# Patient Record
Sex: MALE | Race: OTHER | HISPANIC OR LATINO | Employment: OTHER | URBAN - METROPOLITAN AREA
[De-identification: names, ages, dates, MRNs, and addresses within clinical notes are randomized per-mention and may not be internally consistent; named-entity substitution may affect disease eponyms.]

---

## 2018-07-30 ENCOUNTER — HOSPITAL ENCOUNTER (OUTPATIENT)
Facility: HOSPITAL | Age: 68
Setting detail: OBSERVATION
Discharge: HOME | End: 2018-07-31
Attending: EMERGENCY MEDICINE | Admitting: INTERNAL MEDICINE
Payer: MEDICARE

## 2018-07-30 ENCOUNTER — APPOINTMENT (EMERGENCY)
Dept: RADIOLOGY | Facility: HOSPITAL | Age: 68
End: 2018-07-30
Payer: MEDICARE

## 2018-07-30 DIAGNOSIS — I10 HYPERTENSION: ICD-10-CM

## 2018-07-30 DIAGNOSIS — R07.9 CHEST PAIN: Primary | ICD-10-CM

## 2018-07-30 DIAGNOSIS — E11.9 DIABETES (HCC): ICD-10-CM

## 2018-07-30 DIAGNOSIS — I25.10 CAD (CORONARY ARTERY DISEASE): ICD-10-CM

## 2018-07-30 PROBLEM — E78.5 HYPERLIPIDEMIA: Status: ACTIVE | Noted: 2018-07-30

## 2018-07-30 PROBLEM — N17.9 AKI (ACUTE KIDNEY INJURY) (HCC): Status: ACTIVE | Noted: 2018-07-30

## 2018-07-30 LAB
ALBUMIN SERPL BCP-MCNC: 3.5 G/DL (ref 3.5–5)
ALP SERPL-CCNC: 76 U/L (ref 46–116)
ALT SERPL W P-5'-P-CCNC: 23 U/L (ref 12–78)
ANION GAP SERPL CALCULATED.3IONS-SCNC: 8 MMOL/L (ref 4–13)
APTT PPP: 28 SECONDS (ref 24–36)
APTT PPP: 73 SECONDS (ref 24–36)
AST SERPL W P-5'-P-CCNC: 18 U/L (ref 5–45)
ATRIAL RATE: 56 BPM
ATRIAL RATE: 63 BPM
ATRIAL RATE: 72 BPM
BASOPHILS # BLD AUTO: 0.06 THOUSANDS/ΜL (ref 0–0.1)
BASOPHILS NFR BLD AUTO: 1 % (ref 0–1)
BILIRUB SERPL-MCNC: 0.2 MG/DL (ref 0.2–1)
BUN SERPL-MCNC: 22 MG/DL (ref 5–25)
CALCIUM SERPL-MCNC: 8.6 MG/DL (ref 8.3–10.1)
CHLORIDE SERPL-SCNC: 105 MMOL/L (ref 100–108)
CO2 SERPL-SCNC: 26 MMOL/L (ref 21–32)
CREAT SERPL-MCNC: 1.85 MG/DL (ref 0.6–1.3)
EOSINOPHIL # BLD AUTO: 0.22 THOUSAND/ΜL (ref 0–0.61)
EOSINOPHIL NFR BLD AUTO: 3 % (ref 0–6)
ERYTHROCYTE [DISTWIDTH] IN BLOOD BY AUTOMATED COUNT: 13.2 % (ref 11.6–15.1)
GFR SERPL CREATININE-BSD FRML MDRD: 37 ML/MIN/1.73SQ M
GLUCOSE SERPL-MCNC: 187 MG/DL (ref 65–140)
GLUCOSE SERPL-MCNC: 241 MG/DL (ref 65–140)
HCT VFR BLD AUTO: 32.7 % (ref 36.5–49.3)
HGB BLD-MCNC: 10.6 G/DL (ref 12–17)
IMM GRANULOCYTES # BLD AUTO: 0.02 THOUSAND/UL (ref 0–0.2)
IMM GRANULOCYTES NFR BLD AUTO: 0 % (ref 0–2)
INR PPP: 0.98 (ref 0.86–1.17)
LYMPHOCYTES # BLD AUTO: 2.47 THOUSANDS/ΜL (ref 0.6–4.47)
LYMPHOCYTES NFR BLD AUTO: 29 % (ref 14–44)
MCH RBC QN AUTO: 27.7 PG (ref 26.8–34.3)
MCHC RBC AUTO-ENTMCNC: 32.4 G/DL (ref 31.4–37.4)
MCV RBC AUTO: 85 FL (ref 82–98)
MONOCYTES # BLD AUTO: 0.63 THOUSAND/ΜL (ref 0.17–1.22)
MONOCYTES NFR BLD AUTO: 7 % (ref 4–12)
NEUTROPHILS # BLD AUTO: 5.12 THOUSANDS/ΜL (ref 1.85–7.62)
NEUTS SEG NFR BLD AUTO: 60 % (ref 43–75)
NRBC BLD AUTO-RTO: 0 /100 WBCS
P AXIS: 44 DEGREES
P AXIS: 45 DEGREES
P AXIS: 56 DEGREES
PLATELET # BLD AUTO: 250 THOUSANDS/UL (ref 149–390)
PLATELET # BLD AUTO: 273 THOUSANDS/UL (ref 149–390)
PMV BLD AUTO: 10.2 FL (ref 8.9–12.7)
PMV BLD AUTO: 10.3 FL (ref 8.9–12.7)
POTASSIUM SERPL-SCNC: 4.9 MMOL/L (ref 3.5–5.3)
PR INTERVAL: 144 MS
PR INTERVAL: 150 MS
PR INTERVAL: 152 MS
PROT SERPL-MCNC: 7.3 G/DL (ref 6.4–8.2)
PROTHROMBIN TIME: 12.9 SECONDS (ref 11.8–14.2)
QRS AXIS: 28 DEGREES
QRS AXIS: 35 DEGREES
QRS AXIS: 65 DEGREES
QRSD INTERVAL: 102 MS
QRSD INTERVAL: 92 MS
QRSD INTERVAL: 92 MS
QT INTERVAL: 364 MS
QT INTERVAL: 382 MS
QT INTERVAL: 382 MS
QTC INTERVAL: 368 MS
QTC INTERVAL: 390 MS
QTC INTERVAL: 398 MS
RBC # BLD AUTO: 3.83 MILLION/UL (ref 3.88–5.62)
SODIUM SERPL-SCNC: 139 MMOL/L (ref 136–145)
T WAVE AXIS: -16 DEGREES
T WAVE AXIS: -3 DEGREES
T WAVE AXIS: -6 DEGREES
TROPONIN I SERPL-MCNC: 0.07 NG/ML
TROPONIN I SERPL-MCNC: 0.07 NG/ML
TROPONIN I SERPL-MCNC: 0.08 NG/ML
VENTRICULAR RATE: 56 BPM
VENTRICULAR RATE: 63 BPM
VENTRICULAR RATE: 72 BPM
WBC # BLD AUTO: 8.52 THOUSAND/UL (ref 4.31–10.16)

## 2018-07-30 PROCEDURE — 80053 COMPREHEN METABOLIC PANEL: CPT | Performed by: EMERGENCY MEDICINE

## 2018-07-30 PROCEDURE — 99220 PR INITIAL OBSERVATION CARE/DAY 70 MINUTES: CPT | Performed by: INTERNAL MEDICINE

## 2018-07-30 PROCEDURE — 71046 X-RAY EXAM CHEST 2 VIEWS: CPT

## 2018-07-30 PROCEDURE — 85730 THROMBOPLASTIN TIME PARTIAL: CPT | Performed by: INTERNAL MEDICINE

## 2018-07-30 PROCEDURE — 96374 THER/PROPH/DIAG INJ IV PUSH: CPT

## 2018-07-30 PROCEDURE — 85025 COMPLETE CBC W/AUTO DIFF WBC: CPT | Performed by: EMERGENCY MEDICINE

## 2018-07-30 PROCEDURE — 82948 REAGENT STRIP/BLOOD GLUCOSE: CPT

## 2018-07-30 PROCEDURE — 93010 ELECTROCARDIOGRAM REPORT: CPT | Performed by: INTERNAL MEDICINE

## 2018-07-30 PROCEDURE — 93005 ELECTROCARDIOGRAM TRACING: CPT

## 2018-07-30 PROCEDURE — 36415 COLL VENOUS BLD VENIPUNCTURE: CPT

## 2018-07-30 PROCEDURE — 85730 THROMBOPLASTIN TIME PARTIAL: CPT | Performed by: PHYSICIAN ASSISTANT

## 2018-07-30 PROCEDURE — 84484 ASSAY OF TROPONIN QUANT: CPT | Performed by: EMERGENCY MEDICINE

## 2018-07-30 PROCEDURE — 99204 OFFICE O/P NEW MOD 45 MIN: CPT | Performed by: INTERNAL MEDICINE

## 2018-07-30 PROCEDURE — 84484 ASSAY OF TROPONIN QUANT: CPT | Performed by: INTERNAL MEDICINE

## 2018-07-30 PROCEDURE — 85610 PROTHROMBIN TIME: CPT | Performed by: EMERGENCY MEDICINE

## 2018-07-30 PROCEDURE — 85049 AUTOMATED PLATELET COUNT: CPT | Performed by: PHYSICIAN ASSISTANT

## 2018-07-30 PROCEDURE — 99285 EMERGENCY DEPT VISIT HI MDM: CPT

## 2018-07-30 RX ORDER — LATANOPROST 50 UG/ML
1 SOLUTION/ DROPS OPHTHALMIC
Status: DISCONTINUED | OUTPATIENT
Start: 2018-07-30 | End: 2018-07-31 | Stop reason: HOSPADM

## 2018-07-30 RX ORDER — HEPARIN SODIUM 1000 [USP'U]/ML
4000 INJECTION, SOLUTION INTRAVENOUS; SUBCUTANEOUS AS NEEDED
Status: DISCONTINUED | OUTPATIENT
Start: 2018-07-30 | End: 2018-07-31

## 2018-07-30 RX ORDER — TERAZOSIN 5 MG/1
10 CAPSULE ORAL
Status: DISCONTINUED | OUTPATIENT
Start: 2018-07-30 | End: 2018-07-31 | Stop reason: HOSPADM

## 2018-07-30 RX ORDER — HEPARIN SODIUM 1000 [USP'U]/ML
2000 INJECTION, SOLUTION INTRAVENOUS; SUBCUTANEOUS AS NEEDED
Status: DISCONTINUED | OUTPATIENT
Start: 2018-07-30 | End: 2018-07-31

## 2018-07-30 RX ORDER — AMLODIPINE BESYLATE 5 MG/1
5 TABLET ORAL DAILY
Status: DISCONTINUED | OUTPATIENT
Start: 2018-07-31 | End: 2018-07-31 | Stop reason: HOSPADM

## 2018-07-30 RX ORDER — ASPIRIN 81 MG/1
324 TABLET, CHEWABLE ORAL ONCE
Status: COMPLETED | OUTPATIENT
Start: 2018-07-30 | End: 2018-07-30

## 2018-07-30 RX ORDER — ATORVASTATIN CALCIUM 40 MG/1
40 TABLET, FILM COATED ORAL
Status: DISCONTINUED | OUTPATIENT
Start: 2018-07-31 | End: 2018-07-30 | Stop reason: SDUPTHER

## 2018-07-30 RX ORDER — HEPARIN SODIUM 5000 [USP'U]/ML
5000 INJECTION, SOLUTION INTRAVENOUS; SUBCUTANEOUS EVERY 8 HOURS SCHEDULED
Status: DISCONTINUED | OUTPATIENT
Start: 2018-07-30 | End: 2018-07-30

## 2018-07-30 RX ORDER — NITROGLYCERIN 0.4 MG/1
0.4 TABLET SUBLINGUAL ONCE
Status: COMPLETED | OUTPATIENT
Start: 2018-07-30 | End: 2018-07-30

## 2018-07-30 RX ORDER — DOCUSATE SODIUM 100 MG/1
100 CAPSULE, LIQUID FILLED ORAL 2 TIMES DAILY
Status: DISCONTINUED | OUTPATIENT
Start: 2018-07-30 | End: 2018-07-31 | Stop reason: HOSPADM

## 2018-07-30 RX ORDER — TERAZOSIN 10 MG/1
10 CAPSULE ORAL
COMMUNITY

## 2018-07-30 RX ORDER — NITROGLYCERIN 0.4 MG/1
0.4 TABLET SUBLINGUAL
Status: DISCONTINUED | OUTPATIENT
Start: 2018-07-30 | End: 2018-07-31 | Stop reason: HOSPADM

## 2018-07-30 RX ORDER — ACETAMINOPHEN 325 MG/1
650 TABLET ORAL EVERY 6 HOURS PRN
Status: DISCONTINUED | OUTPATIENT
Start: 2018-07-30 | End: 2018-07-31 | Stop reason: HOSPADM

## 2018-07-30 RX ORDER — GLIPIZIDE 5 MG/1
2.5 TABLET ORAL
COMMUNITY

## 2018-07-30 RX ORDER — CARVEDILOL 3.12 MG/1
3.12 TABLET ORAL 2 TIMES DAILY WITH MEALS
Status: DISCONTINUED | OUTPATIENT
Start: 2018-07-30 | End: 2018-07-31 | Stop reason: HOSPADM

## 2018-07-30 RX ORDER — ASPIRIN 81 MG/1
81 TABLET, CHEWABLE ORAL DAILY
Status: DISCONTINUED | OUTPATIENT
Start: 2018-07-31 | End: 2018-07-31 | Stop reason: HOSPADM

## 2018-07-30 RX ORDER — ATORVASTATIN CALCIUM 40 MG/1
40 TABLET, FILM COATED ORAL
Status: DISCONTINUED | OUTPATIENT
Start: 2018-07-30 | End: 2018-07-31 | Stop reason: HOSPADM

## 2018-07-30 RX ORDER — HEPARIN SODIUM 10000 [USP'U]/100ML
3-20 INJECTION, SOLUTION INTRAVENOUS
Status: DISCONTINUED | OUTPATIENT
Start: 2018-07-30 | End: 2018-07-31

## 2018-07-30 RX ORDER — HEPARIN SODIUM 1000 [USP'U]/ML
4000 INJECTION, SOLUTION INTRAVENOUS; SUBCUTANEOUS ONCE
Status: COMPLETED | OUTPATIENT
Start: 2018-07-30 | End: 2018-07-30

## 2018-07-30 RX ORDER — SODIUM CHLORIDE 9 MG/ML
50 INJECTION, SOLUTION INTRAVENOUS CONTINUOUS
Status: DISPENSED | OUTPATIENT
Start: 2018-07-30 | End: 2018-07-31

## 2018-07-30 RX ORDER — LATANOPROST 50 UG/ML
1 SOLUTION/ DROPS OPHTHALMIC
COMMUNITY

## 2018-07-30 RX ORDER — LABETALOL HYDROCHLORIDE 5 MG/ML
10 INJECTION, SOLUTION INTRAVENOUS ONCE
Status: COMPLETED | OUTPATIENT
Start: 2018-07-30 | End: 2018-07-30

## 2018-07-30 RX ORDER — ONDANSETRON 2 MG/ML
4 INJECTION INTRAMUSCULAR; INTRAVENOUS EVERY 6 HOURS PRN
Status: DISCONTINUED | OUTPATIENT
Start: 2018-07-30 | End: 2018-07-31 | Stop reason: HOSPADM

## 2018-07-30 RX ADMIN — HEPARIN SODIUM AND DEXTROSE 12 UNITS/KG/HR: 10000; 5 INJECTION INTRAVENOUS at 17:54

## 2018-07-30 RX ADMIN — TERAZOSIN HYDROCHLORIDE 10 MG: 5 CAPSULE ORAL at 22:10

## 2018-07-30 RX ADMIN — LABETALOL 20 MG/4 ML (5 MG/ML) INTRAVENOUS SYRINGE 10 MG: at 15:00

## 2018-07-30 RX ADMIN — ATORVASTATIN CALCIUM 40 MG: 40 TABLET, FILM COATED ORAL at 19:03

## 2018-07-30 RX ADMIN — ASPIRIN 81 MG 324 MG: 81 TABLET ORAL at 14:59

## 2018-07-30 RX ADMIN — CARVEDILOL 3.12 MG: 3.12 TABLET, FILM COATED ORAL at 19:02

## 2018-07-30 RX ADMIN — SODIUM CHLORIDE 50 ML/HR: 0.9 INJECTION, SOLUTION INTRAVENOUS at 19:07

## 2018-07-30 RX ADMIN — NITROGLYCERIN 0.4 MG: 0.4 TABLET SUBLINGUAL at 14:59

## 2018-07-30 RX ADMIN — DOCUSATE SODIUM 100 MG: 100 CAPSULE, LIQUID FILLED ORAL at 22:11

## 2018-07-30 RX ADMIN — NITROGLYCERIN 0.5 INCH: 20 OINTMENT TOPICAL at 16:30

## 2018-07-30 RX ADMIN — INSULIN DETEMIR 10 UNITS: 100 INJECTION, SOLUTION SUBCUTANEOUS at 22:11

## 2018-07-30 RX ADMIN — HEPARIN SODIUM 4000 UNITS: 1000 INJECTION, SOLUTION INTRAVENOUS; SUBCUTANEOUS at 17:48

## 2018-07-30 RX ADMIN — LATANOPROST 1 DROP: 50 SOLUTION OPHTHALMIC at 22:12

## 2018-07-30 RX ADMIN — INSULIN LISPRO 1 UNITS: 100 INJECTION, SOLUTION INTRAVENOUS; SUBCUTANEOUS at 22:13

## 2018-07-30 RX ADMIN — METOPROLOL TARTRATE 25 MG: 25 TABLET ORAL at 22:11

## 2018-07-30 NOTE — H&P
H&P- Kamaljit Maldonado 1950, 80 y o  male MRN: [id number]    Unit/Bed#: ED 10 Encounter: [id number]    Primary Care Provider: No primary care provider on file  Date and time admitted to hospital: 7/30/2018  2:00 PM        * Chest pain   Assessment & Plan    Patient presents with history of CP x 3 days  Trop mildly elevated at 0 07  Multiple cardiac risk factors: DM, HTN, hyperlipidemia, male, age 80  Monitor serial troponins, EKGs  Cardiology consult: Stress test vs  Cardiac cath  Check Echo for regional wall motion abnormalities  Asa, BB, Statin, nitro prn  Hyperlipidemia   Assessment & Plan    Continue Statin and check fasting cholesterol  Hypertension   Assessment & Plan    Given Labetolol in ED  Monitor  Will begin Metoprolol  Hold Lisinopril secondary to HEIDY  HEIDY (acute kidney injury) ([facility] [address] )   Assessment & Plan    Baseline Cr unknown  Will trend  Hold nephrotoxic meds  Diabetes ([facility] [address] )   Assessment & Plan    No results found for: HGBA1C    No results for input(s): POCGLU in the last 72 hours  Blood Sugar Average: Last 72 hrs:    He has DM Type 2  On Levemir, Glipizide, and Metformin at home  Monitor POCs, check HGB A1C  Hold oral agents  Continue Levemir and add sliding scale coverage  Diabetic diet  VTE Prophylaxis: Heparin  / sequential compression device   Code Status: Full code  POLST: POLST is not applicable to this patient  Discussion with family: Family members present at bedside  Anticipated Length of Stay:  Patient will be admitted on an Observation basis with an anticipated length of stay of  < 2 midnights  Justification for Hospital Stay: ACS rule out  Total Time for Visit, including Counseling / Coordination of Care: 45 minutes  Greater than 50% of this total time spent on direct patient counseling and coordination of care      Chief Complaint:   Chest pain    History of Present Illness:    Kamaljit Maldonado is a 80 y o  male who presents with chest pain x 3 days  He reports of worsening chest pain today which was unremitting and thus he presented for further evaluation  He describes it as a pressure and tightness  He notes of radiation to the neck and shoulder  He has DM, HTN, and high cholesterol  He reports a vague history of a possible stress test in the 1990s which was negative  He reports a father with a history of an MI at [de-identified]  No SOB  No N/V  No dizziness  He reports improvement of his CP with nitro  Review of Systems:    Review of Systems   Constitutional: Negative  Negative for appetite change, fatigue and fever  HENT: Negative  Eyes: Negative  Respiratory: Negative  Negative for cough, shortness of breath and wheezing  Cardiovascular: Positive for chest pain and palpitations  Gastrointestinal: Negative  Negative for abdominal distention, abdominal pain, nausea and vomiting  Endocrine: Negative  Genitourinary: Negative  Musculoskeletal: Positive for neck pain  Skin: Negative  Neurological: Negative  Psychiatric/Behavioral: Negative  All other systems reviewed and are negative  Past Medical and Surgical History:     Past Medical History:   Diagnosis Date    BPH (benign prostatic hyperplasia)     Diabetes mellitus ([facility] [address] )     Dyslipidemia     Hypertension        Past Surgical History:   Procedure Laterality Date    HERNIA REPAIR         Meds/Allergies:    Prior to Admission medications    Medication Sig Start Date End Date Taking?  Authorizing Provider   Cholecalciferol 1000 units TBDP Take by mouth daily   Yes Historical Provider, MD   glipiZIDE (GLUCOTROL) 5 mg tablet Take 2 5 mg by mouth daily before dinner   Yes Historical Provider, MD   insulin detemir (LEVEMIR) 100 units/mL subcutaneous injection Inject 10 Units under the skin daily at bedtime   Yes Historical Provider, MD   latanoprost (XALATAN) 0 005 % ophthalmic solution Administer 1 drop to both eyes daily at bedtime Yes Historical Provider, MD   LISINOPRIL PO Take 20 mg by mouth daily at bedtime   Yes Historical Provider, MD   metFORMIN (GLUCOPHAGE) 500 mg tablet Take 1,000 mg by mouth 2 (two) times a day with meals   Yes Historical Provider, MD   saxagliptin (ONGLYZA) 5 MG tablet Take 5 mg by mouth daily   Yes Historical Provider, MD   SIMVASTATIN PO Take by mouth daily   Yes Historical Provider, MD   terazosin (HYTRIN) 10 MG capsule Take 10 mg by mouth daily at bedtime   Yes Historical Provider, MD     I have reviewed home medications with patient personally  Allergies: No Known Allergies    Social History:     Marital Status: /Civil Union   Occupation: Retired from [employer]  Patient Pre-hospital Living Situation: Resides at home with wife  Patient Pre-hospital Level of Mobility: Ambulatory  Patient Pre-hospital Diet Restrictions: Diabetic diet  Substance Use History:   History   Alcohol Use No     History   Smoking Status    Never Smoker   Smokeless Tobacco    Never Used     History   Drug Use No       Family History:    Family History   Problem Relation Age of Onset    Heart attack Father [de-identified]       Physical Exam:     Vitals:   Blood Pressure: 136/72 (07/30/18 1515)  Pulse: 59 (07/30/18 1515)  Temperature: 98 1 °F (36 7 °C) (07/30/18 1410)  Temp Source: Oral (07/30/18 1410)  Respirations: 18 (07/30/18 1515)  Height: 5' 9" (175 3 cm) (07/30/18 1408)  Weight - Scale: 84 4 kg (186 lb) (07/30/18 1408)  SpO2: 97 % (07/30/18 1515)    Physical Exam   Constitutional: He is oriented to person, place, and time  He appears well-developed and well-nourished  No distress  HENT:   Head: Normocephalic and atraumatic  Eyes: No scleral icterus  Neck: Neck supple  Cardiovascular: Normal rate, regular rhythm and normal heart sounds  Exam reveals no gallop  No murmur heard  Pulmonary/Chest: Effort normal and breath sounds normal  No respiratory distress  He exhibits no tenderness  Abdominal: Soft   Bowel sounds are normal  There is no tenderness  Musculoskeletal: Normal range of motion  He exhibits no edema  Neurological: He is alert and oriented to person, place, and time  Skin: Skin is warm and dry  He is not diaphoretic  Psychiatric: He has a normal mood and affect  Nursing note and vitals reviewed  Additional Data:     Lab Results: I have personally reviewed pertinent reports  Results from last 7 days  Lab Units 07/30/18  1411   WBC Thousand/uL 8 52   HEMOGLOBIN g/dL 10 6*   HEMATOCRIT % 32 7*   PLATELETS Thousands/uL 273   NEUTROS PCT % 60   LYMPHS PCT % 29   MONOS PCT % 7   EOS PCT % 3       Results from last 7 days  Lab Units 07/30/18  1411   SODIUM mmol/L 139   POTASSIUM mmol/L 4 9   CHLORIDE mmol/L 105   CO2 mmol/L 26   BUN mg/dL 22   CREATININE mg/dL 1 85*   CALCIUM mg/dL 8 6   TOTAL PROTEIN g/dL 7 3   BILIRUBIN TOTAL mg/dL 0 20   ALK PHOS U/L 76   ALT U/L 23   AST U/L 18   GLUCOSE RANDOM mg/dL 241*       Results from last 7 days  Lab Units 07/30/18  1411   INR  0 98               Imaging: I have personally reviewed pertinent reports  and I have personally reviewed pertinent films in PACS    X-ray chest 2 views   Final Result by Chela Shea MD (07/30 1548)      No acute cardiopulmonary disease  Workstation performed: [id number]             EKG, Pathology, and Other Studies Reviewed on Admission:   · EKG: personally reviewed: NSR and inferior t wave inversions, no ST elevations  Allscripts / Epic Records Reviewed: Yes     ** Please Note: This note has been constructed using a voice recognition system   **

## 2018-07-30 NOTE — ED PROVIDER NOTES
History  Chief Complaint   Patient presents with    Chest Pain     Patient c/o left upper chest pain that started 3 days ago  Patient also states"he started having arm pain along with the chest pain"  C/o L sided chest pressure for 3 days, intermittent  Today cp is worse and more constant  Not necessarily associated with exertion  No sob, no diaphoresis, dizziness, nausea  Cp radiates to his L neck and L arm  Pt  Mariia Sol his HTN meds this am   His bp is 183/89  No cardiac history  CAD risk factors:  IDDM, HTN, hypercholesterolemia, nonsmoker, no premature family hx of CAD    Pt  Never had a stress test             None       Past Medical History:   Diagnosis Date    Diabetes mellitus ([facility] [address] )     Hypertension        Past Surgical History:   Procedure Laterality Date    HERNIA REPAIR         History reviewed  No pertinent family history  I have reviewed and agree with the history as documented  Social History   Substance Use Topics    Smoking status: Never Smoker    Smokeless tobacco: Never Used    Alcohol use No        Review of Systems   Constitutional: Negative for appetite change, fatigue and fever  HENT: Negative for sore throat  Respiratory: Negative for cough, shortness of breath and wheezing  Cardiovascular: Positive for chest pain  Negative for leg swelling  Gastrointestinal: Negative for abdominal pain, diarrhea and vomiting  Genitourinary: Negative for dysuria and flank pain  Musculoskeletal: Negative for back pain and neck pain  Skin: Negative for rash  Neurological: Negative for syncope and headaches  Psychiatric/Behavioral:        Mood normal       Physical Exam  Physical Exam   Constitutional: He is oriented to person, place, and time  He appears well-developed and well-nourished  HENT:   Head: Normocephalic and atraumatic  Neck: Normal range of motion  Neck supple  Cardiovascular: Normal rate and regular rhythm      Pulmonary/Chest: Effort normal and breath sounds normal    Abdominal: Soft  There is no tenderness  Musculoskeletal: Normal range of motion  Neurological: He is alert and oriented to person, place, and time  Skin: Skin is warm and dry  Nursing note and vitals reviewed        Vital Signs  ED Triage Vitals   Temperature Pulse Respirations Blood Pressure SpO2   07/30/18 1410 07/30/18 1408 07/30/18 1408 07/30/18 1408 07/30/18 1408   98 1 °F (36 7 °C) 71 18 (!) 183/89 100 %      Temp Source Heart Rate Source Patient Position - Orthostatic VS BP Location FiO2 (%)   07/30/18 1408 07/30/18 1408 07/30/18 1408 07/30/18 1408 --   Oral Monitor Lying Right arm       Pain Score       --                  Vitals:    07/30/18 1408 07/30/18 1515   BP: (!) 183/89 136/72   Pulse: 71 59   Patient Position - Orthostatic VS: Lying Sitting       Visual Acuity      ED Medications  Medications   nitroglycerin (NITRO-BID) 2 % TD ointment 0 5 inch (not administered)   aspirin chewable tablet 324 mg (324 mg Oral Given 7/30/18 1459)   nitroglycerin (NITROSTAT) SL tablet 0 4 mg (0 4 mg Sublingual Given 7/30/18 1459)   labetalol (NORMODYNE) injection 10 mg (10 mg Intravenous Given 7/30/18 1500)       Diagnostic Studies  Results Reviewed     Procedure Component Value Units Date/Time    Troponin I [[id number]]     Lab Status:  No result Specimen:  Blood     Troponin I [[id number]]  (Abnormal) Collected:  07/30/18 1411    Lab Status:  Final result Specimen:  Blood from Arm, Right Updated:  07/30/18 1440     Troponin I 0 07 (H) ng/mL     Comprehensive metabolic panel [[id number]]  (Abnormal) Collected:  07/30/18 1411    Lab Status:  Final result Specimen:  Blood from Arm, Right Updated:  07/30/18 1436     Sodium 139 mmol/L      Potassium 4 9 mmol/L      Chloride 105 mmol/L      CO2 26 mmol/L      Anion Gap 8 mmol/L      BUN 22 mg/dL      Creatinine 1 85 (H) mg/dL      Glucose 241 (H) mg/dL      Calcium 8 6 mg/dL      AST 18 U/L      ALT 23 U/L      Alkaline Phosphatase 76 U/L Total Protein 7 3 g/dL      Albumin 3 5 g/dL      Total Bilirubin 0 20 mg/dL      eGFR 37 ml/min/1 73sq m     Narrative:         National Kidney Disease Education Program recommendations are as follows:  GFR calculation is accurate only with a steady state creatinine  Chronic Kidney disease less than 60 ml/min/1 73 sq  meters  Kidney failure less than 15 ml/min/1 73 sq  meters  Protime-INR [[id number]]  (Normal) Collected:  07/30/18 1411    Lab Status:  Final result Specimen:  Blood from Arm, Right Updated:  07/30/18 1427     Protime 12 9 seconds      INR 0 98    CBC and differential [[id number]]  (Abnormal) Collected:  07/30/18 1411    Lab Status:  Final result Specimen:  Blood from Arm, Right Updated:  07/30/18 1417     WBC 8 52 Thousand/uL      RBC 3 83 (L) Million/uL      Hemoglobin 10 6 (L) g/dL      Hematocrit 32 7 (L) %      MCV 85 fL      MCH 27 7 pg      MCHC 32 4 g/dL      RDW 13 2 %      MPV 10 3 fL      Platelets 946 Thousands/uL      nRBC 0 /100 WBCs      Neutrophils Relative 60 %      Immat GRANS % 0 %      Lymphocytes Relative 29 %      Monocytes Relative 7 %      Eosinophils Relative 3 %      Basophils Relative 1 %      Neutrophils Absolute 5 12 Thousands/µL      Immature Grans Absolute 0 02 Thousand/uL      Lymphocytes Absolute 2 47 Thousands/µL      Monocytes Absolute 0 63 Thousand/µL      Eosinophils Absolute 0 22 Thousand/µL      Basophils Absolute 0 06 Thousands/µL                  X-ray chest 2 views   Final Result by Matheus Marino MD (07/30 1548)      No acute cardiopulmonary disease              Workstation performed: [id number]                    Procedures  ECG 12 Lead Documentation  Date/Time: 7/30/2018 2:10 PM  Performed by: NIKHIL Craven  Authorized by: NIKHIL Craven     Rate:     ECG rate:  77    ECG rate assessment: normal    Rhythm:     Rhythm: sinus rhythm    Comments:      T wave inversion in III           Phone Contacts  ED Phone Contact    ED Course HEART Risk Score      Most Recent Value   History  2 Filed at: 07/30/2018 1554   ECG  1 Filed at: 07/30/2018 1554   Age  2 Filed at: 07/30/2018 1554   Risk Factors  2 Filed at: 07/30/2018 1554   Troponin  1 Filed at: 07/30/2018 1554   Heart Score Risk Calculator   History  2 Filed at: 07/30/2018 1554   ECG  1 Filed at: 07/30/2018 1554   Age  2 Filed at: 07/30/2018 1554   Risk Factors  2 Filed at: 07/30/2018 1554   Troponin  1 Filed at: 07/30/2018 1554   HEART Score  8 Filed at: 07/30/2018 1554   HEART Score  8 Filed at: 07/30/2018 1554        Identification of Seniors at Risk      Most Recent Value   (ISAR) Identification of Seniors at Risk   Before the illness or injury that brought you to the Emergency, did you need someone to help you on a regular basis? 0 Filed at: 07/30/2018 1405   In the last 24 hours, have you needed more help than usual?  0 Filed at: 07/30/2018 1405   Have you been hospitalized for one or more nights during the past 6 months?    In general, do you see well? 1 Filed at: 07/30/2018 1405   In general, do you have serious problems with your memory?   0 Filed at: 07/30/2018 1405   Do you take more than three different medications every day?  0 Filed at: 07/30/2018 1405   ISAR Score  1 Filed at: 07/30/2018 1405                          MDM  Number of Diagnoses or Management Options  Chest pain:      Amount and/or Complexity of Data Reviewed  Clinical lab tests: ordered and reviewed  Tests in the radiology section of CPT®: ordered and reviewed    Risk of Complications, Morbidity, and/or Mortality  Presenting problems: moderate  General comments: Cp improved with asa , nitro, labetalol    hospitalist admitted for further workup      CritCare Time    Disposition  Final diagnoses:   Chest pain   Hypertension     Time reflects when diagnosis was documented in both MDM as applicable and the Disposition within this note     Time User Action Codes Description Comment    7/30/2018  3:52 PM Terri Martel Add [R07 9] Chest pain     7/30/2018  3:56 PM Sixto Delvalle Add [I10] Hypertension       ED Disposition     ED Disposition Condition Comment    Admit  Case was discussed with DONAL  and the patient's admission status was agreed to be obs  Jaspreet Deras      Follow-up Information    None         Patient's Medications    No medications on file     No discharge procedures on file      ED Provider  Electronically Signed by           Terra Vaughan MD  07/30/18 9334

## 2018-07-30 NOTE — CONSULTS
Consultation - Cardiology    Candace Patino 80 y o  male MRN: [id number]  Unit/Bed#: ED 10 Encounter: [id number]    Physician Requesting Consult: Rubin Dumont*    Reason for Consult / Principal Problem: chest pain     HPI: Candace Patino is a 80y o  year old male with a past medical history significant for hypertension, hyperlipidemia, diabetes mellitus  He presents with complaints of left sided chest pressure with radiation to neck and left arm which has been intermittent for the last 3 days  His symptoms were worse and more constant this morning  He does not necessarily relate exertional component  He got nitro past and now feels improved  He denies prior history of CAD/MI  He did have cardiac catheterization in 1997 which was without CAD  He denies history of tobacco abuse  He denies family history of CAD  He denies history of major bleeding or allergies  He is unaware of CKD history  Historical Information   Past Medical History:   Diagnosis Date    BPH (benign prostatic hyperplasia)     Diabetes mellitus ([address] )     Dyslipidemia     Hypertension      Past Surgical History:   Procedure Laterality Date    HERNIA REPAIR       History   Alcohol Use No     History   Drug Use No     History   Smoking Status    Never Smoker   Smokeless Tobacco    Never Used       FAMILY HISTORY:  Family History   Problem Relation Age of Onset    Heart attack Father [de-identified]       MEDS & ALLERGIES:  all current active meds have been reviewed and current meds:   No current facility-administered medications for this encounter  No current facility-administered medications for this encounter  No Known Allergies      REVIEW OF SYSTEMS:  Constitutional: Denies fever or chills  Eyes: Denies visual disturbance change in visual acuity   HENT: Denies nasal congestion or sore throat   Respiratory: +SOB Denies cough  Cardiovascular: +CP   Deniespalpitations or lower extremity swelling   GI: Denies abdominal pain, nausea, vomiting, bloody stools or diarrhea   : Denies dysuria, frequency, hematuria  Musculoskeletal: Denies back pain or joint pain   Neurologic: Denies lightheadedness, syncope, headache, focal weakness or sensory changes   Psychiatric: Denies depression, anxiety or panic       PHYSICAL EXAM:  Vitals:   Vitals:    07/30/18 1515   BP: 136/72   Pulse: 59   Resp: 18   Temp:    SpO2: 97%     Body mass index is 27 47 kg/m²  Systolic (98OGK), HCK:008 , Min:136 , BIN:254     Diastolic (38ZJX), QNM:90, Min:72, Max:89    No intake or output data in the 24 hours ending 07/30/18 1713  Weight (last 2 days)     Date/Time   Weight    07/30/18 1408  84 4 (186)            Invasive Devices     Peripheral Intravenous Line            Peripheral IV 07/30/18 Right Antecubital less than 1 day                General: Patient is not in acute distress  Laying comfortably in the bed  Head: Normocephalic  Atraumatic  HEENT: Both pupils normal sized, round and reactive to light  Nonicteric  Neck: JVP not elevated  Trachea central    Respiratory: Bilateral bronchovascular breath sounds with no crackles or rhonchi  Cardiovascular: RRR  S1 and S2 normal  No murmur, rub or gallop  GI: Abdomen soft, nontender  No guarding or rigidity  Neurologic: Patient is awake, alert, responding to commands  Well-oriented to time, place and person   Moving all extremities  Extremities: No clubbing, cyanosis or edema  Integument: No skin rashes or ulceration      LABORATORY RESULTS:    Results from last 7 days  Lab Units 07/30/18  1411   TROPONIN I ng/mL 0 07*       CBC with diff:   Results from last 7 days  Lab Units 07/30/18  1411   WBC Thousand/uL 8 52   HEMOGLOBIN g/dL 10 6*   HEMATOCRIT % 32 7*   MCV fL 85   PLATELETS Thousands/uL 273   MCH pg 27 7   MCHC g/dL 32 4   RDW % 13 2   MPV fL 10 3   NRBC AUTO /100 WBCs 0       CMP:  Results from last 7 days  Lab Units 07/30/18  1411   SODIUM mmol/L 139   POTASSIUM mmol/L 4 9   CHLORIDE mmol/L 105   CO2 mmol/L 26   ANION GAP mmol/L 8   BUN mg/dL 22   CREATININE mg/dL 1 85*   GLUCOSE RANDOM mg/dL 241*   CALCIUM mg/dL 8 6   AST U/L 18   ALT U/L 23   ALK PHOS U/L 76   TOTAL PROTEIN g/dL 7 3   BILIRUBIN TOTAL mg/dL 0 20   EGFR ml/min/1 73sq m 37       BMP:  Results from last 7 days  Lab Units 07/30/18  1411   SODIUM mmol/L 139   POTASSIUM mmol/L 4 9   CHLORIDE mmol/L 105   CO2 mmol/L 26   BUN mg/dL 22   CREATININE mg/dL 1 85*   GLUCOSE RANDOM mg/dL 241*   CALCIUM mg/dL 8 6                          Results from last 7 days  Lab Units 07/30/18  1411   INR  0 98         Imaging: I have personally reviewed pertinent reports  Assessment and Plan:  Chest pain  -elevated troponin-- 0 07 will continue to trend  -EKG showed ischemic changes in inferior leads-- T wave inversions   -given patient's progressive symptoms of chest pressure and multiple risk factors he would likely be too high risk for stress testing alone  -Options for further evaluation discussed with the patient  Benefits and limitations of stress testing explained  Patient advised he would likely be too high-risk for CAD rule out with stress testing alone  Definitive evaluation with cardiac catheterization advised  Risks, benefits, and possible outcomes of cardiac catheterization discussed  He was warned of the risks of infection, bleeding, kidney damage, stroke, MI  Patient agreeable to cardiac catheterization and wishes to proceed  Consent to be obtained by interventional cardiologist Dr Leigh Ann Gonzalez who will be performing the procedure    -creatinine 1 85 will hydrate overnight   -will start on heparin drip  -echocardiogram to evaluate LVEF and wall motion tomorrow    Hypertension  -elevated, will start on Coreg and amlodipine  Hold lisinopril pending cardiac catheterization tomorrow elevated creatinine      Hyperlipidemia  -morning lipid panel ordered   Will start on lipitor           Code Status: No Order      Thank you for allowing us to participate in this patient's care  Counseling / Coordination of Care  Total floor / unit time spent today 35 minutes  Greater than 50% of total time was spent with the patient and / or family counseling and / or coordination of care  A description of the counseling / coordination of care: Review of history, current assessment, development of a plan         Madeline Blum PA-C  7/30/2018,5:13 PM

## 2018-07-30 NOTE — ASSESSMENT & PLAN NOTE
No results found for: HGBA1C    No results for input(s): POCGLU in the last 72 hours  Blood Sugar Average: Last 72 hrs:    He has DM Type 2  On Levemir, Glipizide, and Metformin at home  Monitor POCs, check HGB A1C  Hold oral agents  Continue Levemir and add sliding scale coverage  Diabetic diet

## 2018-07-31 ENCOUNTER — APPOINTMENT (OUTPATIENT)
Dept: NON INVASIVE DIAGNOSTICS | Facility: HOSPITAL | Age: 68
End: 2018-07-31
Payer: MEDICARE

## 2018-07-31 ENCOUNTER — APPOINTMENT (OUTPATIENT)
Dept: INTERVENTIONAL RADIOLOGY/VASCULAR | Facility: HOSPITAL | Age: 68
End: 2018-07-31
Payer: MEDICARE

## 2018-07-31 VITALS
WEIGHT: 188.93 LBS | OXYGEN SATURATION: 98 % | RESPIRATION RATE: 17 BRPM | SYSTOLIC BLOOD PRESSURE: 136 MMHG | BODY MASS INDEX: 27.98 KG/M2 | DIASTOLIC BLOOD PRESSURE: 78 MMHG | HEIGHT: 69 IN | HEART RATE: 60 BPM | TEMPERATURE: 98.4 F

## 2018-07-31 LAB
ANION GAP SERPL CALCULATED.3IONS-SCNC: 6 MMOL/L (ref 4–13)
APTT PPP: 58 SECONDS (ref 24–36)
APTT PPP: 62 SECONDS (ref 24–36)
BUN SERPL-MCNC: 20 MG/DL (ref 5–25)
CALCIUM SERPL-MCNC: 8.5 MG/DL (ref 8.3–10.1)
CHLORIDE SERPL-SCNC: 108 MMOL/L (ref 100–108)
CHOLEST SERPL-MCNC: 146 MG/DL (ref 50–200)
CO2 SERPL-SCNC: 27 MMOL/L (ref 21–32)
CREAT SERPL-MCNC: 1.53 MG/DL (ref 0.6–1.3)
ERYTHROCYTE [DISTWIDTH] IN BLOOD BY AUTOMATED COUNT: 13.4 % (ref 11.6–15.1)
EST. AVERAGE GLUCOSE BLD GHB EST-MCNC: 200 MG/DL
GFR SERPL CREATININE-BSD FRML MDRD: 46 ML/MIN/1.73SQ M
GLUCOSE P FAST SERPL-MCNC: 115 MG/DL (ref 65–99)
GLUCOSE SERPL-MCNC: 115 MG/DL (ref 65–140)
GLUCOSE SERPL-MCNC: 119 MG/DL (ref 65–140)
GLUCOSE SERPL-MCNC: 159 MG/DL (ref 65–140)
GLUCOSE SERPL-MCNC: 239 MG/DL (ref 65–140)
HBA1C MFR BLD: 8.6 % (ref 4.2–6.3)
HCT VFR BLD AUTO: 30.3 % (ref 36.5–49.3)
HDLC SERPL-MCNC: 51 MG/DL (ref 40–60)
HGB BLD-MCNC: 9.9 G/DL (ref 12–17)
INR PPP: 1.05 (ref 0.86–1.17)
LDLC SERPL CALC-MCNC: 87 MG/DL (ref 0–100)
MAGNESIUM SERPL-MCNC: 2 MG/DL (ref 1.6–2.6)
MCH RBC QN AUTO: 28 PG (ref 26.8–34.3)
MCHC RBC AUTO-ENTMCNC: 32.7 G/DL (ref 31.4–37.4)
MCV RBC AUTO: 86 FL (ref 82–98)
PLATELET # BLD AUTO: 257 THOUSANDS/UL (ref 149–390)
PMV BLD AUTO: 10.4 FL (ref 8.9–12.7)
POTASSIUM SERPL-SCNC: 5.2 MMOL/L (ref 3.5–5.3)
PROTHROMBIN TIME: 13.6 SECONDS (ref 11.8–14.2)
RBC # BLD AUTO: 3.53 MILLION/UL (ref 3.88–5.62)
SODIUM SERPL-SCNC: 141 MMOL/L (ref 136–145)
TRIGL SERPL-MCNC: 41 MG/DL
TROPONIN I SERPL-MCNC: 0.07 NG/ML
TROPONIN I SERPL-MCNC: 0.07 NG/ML
WBC # BLD AUTO: 8.13 THOUSAND/UL (ref 4.31–10.16)

## 2018-07-31 PROCEDURE — C1887 CATHETER, GUIDING: HCPCS | Performed by: PHYSICIAN ASSISTANT

## 2018-07-31 PROCEDURE — 82948 REAGENT STRIP/BLOOD GLUCOSE: CPT

## 2018-07-31 PROCEDURE — 84484 ASSAY OF TROPONIN QUANT: CPT | Performed by: NURSE PRACTITIONER

## 2018-07-31 PROCEDURE — C1894 INTRO/SHEATH, NON-LASER: HCPCS | Performed by: PHYSICIAN ASSISTANT

## 2018-07-31 PROCEDURE — 99153 MOD SED SAME PHYS/QHP EA: CPT | Performed by: PHYSICIAN ASSISTANT

## 2018-07-31 PROCEDURE — 93306 TTE W/DOPPLER COMPLETE: CPT | Performed by: INTERNAL MEDICINE

## 2018-07-31 PROCEDURE — 80061 LIPID PANEL: CPT | Performed by: PHYSICIAN ASSISTANT

## 2018-07-31 PROCEDURE — 85610 PROTHROMBIN TIME: CPT | Performed by: PHYSICIAN ASSISTANT

## 2018-07-31 PROCEDURE — C1769 GUIDE WIRE: HCPCS | Performed by: PHYSICIAN ASSISTANT

## 2018-07-31 PROCEDURE — 83036 HEMOGLOBIN GLYCOSYLATED A1C: CPT | Performed by: PHYSICIAN ASSISTANT

## 2018-07-31 PROCEDURE — 99152 MOD SED SAME PHYS/QHP 5/>YRS: CPT | Performed by: INTERNAL MEDICINE

## 2018-07-31 PROCEDURE — 92978 ENDOLUMINL IVUS OCT C 1ST: CPT | Performed by: PHYSICIAN ASSISTANT

## 2018-07-31 PROCEDURE — 99152 MOD SED SAME PHYS/QHP 5/>YRS: CPT | Performed by: PHYSICIAN ASSISTANT

## 2018-07-31 PROCEDURE — C1760 CLOSURE DEV, VASC: HCPCS | Performed by: PHYSICIAN ASSISTANT

## 2018-07-31 PROCEDURE — 93306 TTE W/DOPPLER COMPLETE: CPT

## 2018-07-31 PROCEDURE — 85730 THROMBOPLASTIN TIME PARTIAL: CPT | Performed by: PHYSICIAN ASSISTANT

## 2018-07-31 PROCEDURE — 80048 BASIC METABOLIC PNL TOTAL CA: CPT | Performed by: PHYSICIAN ASSISTANT

## 2018-07-31 PROCEDURE — 85730 THROMBOPLASTIN TIME PARTIAL: CPT | Performed by: INTERNAL MEDICINE

## 2018-07-31 PROCEDURE — 85027 COMPLETE CBC AUTOMATED: CPT | Performed by: PHYSICIAN ASSISTANT

## 2018-07-31 PROCEDURE — 93458 L HRT ARTERY/VENTRICLE ANGIO: CPT | Performed by: PHYSICIAN ASSISTANT

## 2018-07-31 PROCEDURE — C1753 CATH, INTRAVAS ULTRASOUND: HCPCS | Performed by: PHYSICIAN ASSISTANT

## 2018-07-31 PROCEDURE — 99217 PR OBSERVATION CARE DISCHARGE MANAGEMENT: CPT | Performed by: INTERNAL MEDICINE

## 2018-07-31 PROCEDURE — 93458 L HRT ARTERY/VENTRICLE ANGIO: CPT | Performed by: INTERNAL MEDICINE

## 2018-07-31 PROCEDURE — 83735 ASSAY OF MAGNESIUM: CPT | Performed by: PHYSICIAN ASSISTANT

## 2018-07-31 PROCEDURE — 92978 ENDOLUMINL IVUS OCT C 1ST: CPT | Performed by: INTERNAL MEDICINE

## 2018-07-31 RX ORDER — ASPIRIN 81 MG/1
81 TABLET, CHEWABLE ORAL DAILY
Qty: 30 TABLET | Refills: 0 | Status: SHIPPED | OUTPATIENT
Start: 2018-08-01

## 2018-07-31 RX ORDER — VERAPAMIL HCL 2.5 MG/ML
AMPUL (ML) INTRAVENOUS CODE/TRAUMA/SEDATION MEDICATION
Status: COMPLETED | OUTPATIENT
Start: 2018-07-31 | End: 2018-07-31

## 2018-07-31 RX ORDER — ISOSORBIDE MONONITRATE 30 MG/1
30 TABLET, EXTENDED RELEASE ORAL DAILY
Status: DISCONTINUED | OUTPATIENT
Start: 2018-07-31 | End: 2018-07-31 | Stop reason: HOSPADM

## 2018-07-31 RX ORDER — FENTANYL CITRATE 50 UG/ML
INJECTION, SOLUTION INTRAMUSCULAR; INTRAVENOUS CODE/TRAUMA/SEDATION MEDICATION
Status: COMPLETED | OUTPATIENT
Start: 2018-07-31 | End: 2018-07-31

## 2018-07-31 RX ORDER — ISOSORBIDE MONONITRATE 30 MG/1
30 TABLET, EXTENDED RELEASE ORAL DAILY
Qty: 30 TABLET | Refills: 0 | Status: SHIPPED | OUTPATIENT
Start: 2018-07-31

## 2018-07-31 RX ORDER — LIDOCAINE HYDROCHLORIDE 10 MG/ML
INJECTION, SOLUTION INFILTRATION; PERINEURAL CODE/TRAUMA/SEDATION MEDICATION
Status: COMPLETED | OUTPATIENT
Start: 2018-07-31 | End: 2018-07-31

## 2018-07-31 RX ORDER — MIDAZOLAM HYDROCHLORIDE 1 MG/ML
INJECTION INTRAMUSCULAR; INTRAVENOUS CODE/TRAUMA/SEDATION MEDICATION
Status: COMPLETED | OUTPATIENT
Start: 2018-07-31 | End: 2018-07-31

## 2018-07-31 RX ORDER — CARVEDILOL 3.12 MG/1
3.12 TABLET ORAL 2 TIMES DAILY WITH MEALS
Qty: 30 TABLET | Refills: 0 | Status: ON HOLD | OUTPATIENT
Start: 2018-07-31 | End: 2020-12-28 | Stop reason: SDUPTHER

## 2018-07-31 RX ORDER — ATORVASTATIN CALCIUM 40 MG/1
40 TABLET, FILM COATED ORAL
Qty: 30 TABLET | Refills: 0 | Status: SHIPPED | OUTPATIENT
Start: 2018-07-31

## 2018-07-31 RX ORDER — SODIUM CHLORIDE 9 MG/ML
75 INJECTION, SOLUTION INTRAVENOUS ONCE
Status: COMPLETED | OUTPATIENT
Start: 2018-07-31 | End: 2018-07-31

## 2018-07-31 RX ORDER — NITROGLYCERIN 0.4 MG/1
0.4 TABLET SUBLINGUAL
Qty: 90 TABLET | Refills: 0 | Status: SHIPPED | OUTPATIENT
Start: 2018-07-31

## 2018-07-31 RX ORDER — NITROGLYCERIN 20 MG/100ML
INJECTION INTRAVENOUS CODE/TRAUMA/SEDATION MEDICATION
Status: COMPLETED | OUTPATIENT
Start: 2018-07-31 | End: 2018-07-31

## 2018-07-31 RX ORDER — AMLODIPINE BESYLATE 5 MG/1
5 TABLET ORAL DAILY
Qty: 30 TABLET | Refills: 0 | Status: SHIPPED | OUTPATIENT
Start: 2018-08-01

## 2018-07-31 RX ADMIN — SODIUM CHLORIDE 75 ML/HR: 0.9 INJECTION, SOLUTION INTRAVENOUS at 11:25

## 2018-07-31 RX ADMIN — ASPIRIN 81 MG CHEWABLE TABLET 81 MG: 81 TABLET CHEWABLE at 10:45

## 2018-07-31 RX ADMIN — NITROGLYCERIN 200 MCG: 20 INJECTION INTRAVENOUS at 09:14

## 2018-07-31 RX ADMIN — LIDOCAINE HYDROCHLORIDE 1 ML: 10 INJECTION, SOLUTION INFILTRATION; PERINEURAL at 09:12

## 2018-07-31 RX ADMIN — LIDOCAINE HYDROCHLORIDE 9 ML: 10 INJECTION, SOLUTION INFILTRATION; PERINEURAL at 09:17

## 2018-07-31 RX ADMIN — FENTANYL CITRATE 50 MCG: 50 INJECTION, SOLUTION INTRAMUSCULAR; INTRAVENOUS at 09:59

## 2018-07-31 RX ADMIN — INSULIN LISPRO 1 UNITS: 100 INJECTION, SOLUTION INTRAVENOUS; SUBCUTANEOUS at 11:32

## 2018-07-31 RX ADMIN — CARVEDILOL 3.12 MG: 3.12 TABLET, FILM COATED ORAL at 18:03

## 2018-07-31 RX ADMIN — INSULIN LISPRO 2 UNITS: 100 INJECTION, SOLUTION INTRAVENOUS; SUBCUTANEOUS at 18:04

## 2018-07-31 RX ADMIN — ISOSORBIDE MONONITRATE 30 MG: 30 TABLET, EXTENDED RELEASE ORAL at 14:29

## 2018-07-31 RX ADMIN — ATORVASTATIN CALCIUM 40 MG: 40 TABLET, FILM COATED ORAL at 18:02

## 2018-07-31 RX ADMIN — AMLODIPINE BESYLATE 5 MG: 5 TABLET ORAL at 10:46

## 2018-07-31 RX ADMIN — CARVEDILOL 3.12 MG: 3.12 TABLET, FILM COATED ORAL at 07:30

## 2018-07-31 RX ADMIN — DOCUSATE SODIUM 100 MG: 100 CAPSULE, LIQUID FILLED ORAL at 18:02

## 2018-07-31 RX ADMIN — HEPARIN SODIUM 2000 UNITS: 1000 INJECTION, SOLUTION INTRAVENOUS; SUBCUTANEOUS at 07:29

## 2018-07-31 RX ADMIN — MIDAZOLAM HYDROCHLORIDE 2 MG: 1 INJECTION, SOLUTION INTRAMUSCULAR; INTRAVENOUS at 09:11

## 2018-07-31 RX ADMIN — VERAPAMIL HYDROCHLORIDE 2.5 MCG: 2.5 INJECTION, SOLUTION INTRAVENOUS at 09:14

## 2018-07-31 RX ADMIN — METOPROLOL TARTRATE 25 MG: 25 TABLET ORAL at 10:46

## 2018-07-31 RX ADMIN — FENTANYL CITRATE 50 MCG: 50 INJECTION, SOLUTION INTRAMUSCULAR; INTRAVENOUS at 09:11

## 2018-07-31 RX ADMIN — IODIXANOL 75 ML: 270 INJECTION, SOLUTION INTRAVASCULAR at 10:02

## 2018-07-31 RX ADMIN — DOCUSATE SODIUM 100 MG: 100 CAPSULE, LIQUID FILLED ORAL at 10:46

## 2018-07-31 NOTE — PLAN OF CARE
Problem: DISCHARGE PLANNING - CARE MANAGEMENT  Goal: Discharge to post-acute care or home with appropriate resources  INTERVENTIONS:  - Conduct assessment to determine patient/family and health care team treatment goals, and need for post-acute services based on payer coverage, community resources, and patient preferences, and barriers to discharge  - Address psychosocial, clinical, and financial barriers to discharge as identified in assessment in conjunction with the patient/family and health care team  - Arrange appropriate level of post-acute services according to patients   needs and preference and payer coverage in collaboration with the physician and health care team  - Communicate with and update the patient/family, physician, and health care team regarding progress on the discharge plan  - Arrange appropriate transportation to post-acute venues  Outcome: Progressing  Pt acknowledged understanding of observation status  Pt is independent with family support  He utilizes the services of the [facility]  He does not have any CM needs at this time  Will continue to assess needs prior to discharge

## 2018-07-31 NOTE — CASE MANAGEMENT
Initial Clinical Review    Admission: Date/Time/Statement: OBSERVATION 7/30/18 @1552    Orders Placed This Encounter   Procedures    Place in Observation (expected length of stay for this patient is less than two midnights)     Standing Status:   Standing     Number of Occurrences:   1     Order Specific Question:   Admitting Physician     Answer:   Socorro Rosado     Order Specific Question:   Level of Care     Answer:   Med Surg [16]     ED Arrival Information     Expected Arrival Acuity Means of Arrival Escorted By Service Admission Type    - 7/30/2018 13:57 Urgent Walk-In Family Member General Medicine Urgent    Arrival Complaint    CHEST/ARM PAIN X 3 DAYS          Chief Complaint:   Chief Complaint   Patient presents with    Chest Pain     Patient c/o left upper chest pain that started 3 days ago  Patient also states"he started having arm pain along with the chest pain"  History of Illness: 80 y o  male who presents with chest pain x 3 days, worsening today which was unremitting  Pressure and tightness, radiating to the neck and shoulder  He reports a father with a history of an MI at [de-identified]  He reports improvement of his CP with nitro      ED Vital Signs:   ED Triage Vitals   Temperature Pulse Respirations Blood Pressure SpO2   07/30/18 1410 07/30/18 1408 07/30/18 1408 07/30/18 1408 07/30/18 1408   98 1 °F (36 7 °C) 71 18 (!) 183/89 100 %      Temp Source Heart Rate Source Patient Position - Orthostatic VS BP Location FiO2 (%)   07/30/18 1408 07/30/18 1408 07/30/18 1408 07/30/18 1408 --   Oral Monitor Lying Right arm       Pain Score       07/30/18 1854       5        Wt Readings from Last 1 Encounters:   07/30/18 85 7 kg (188 lb 15 oz)       Vital Signs (abnormal): 181/90   196/91    Abnormal Labs/Diagnostic Test Results:   7/30: creat 1 85   Gluc 241   Trop  07,  07,  08,  07     Pt 73  hgb 10 6  hct 32 7  EKG1: Normal sinus rhythm  T wave abnormality, consider inferior ischemia (ED MD read as inferior T inversions)  EKG2: Sinus bradycardia with occasional Premature ventricular complexes  Possible Inferior infarct , age undetermined  EKG3: nsr  CXR: nothing acute    7/31: trop   07   Creat 1 53   Gluc 115    hgb 9 9   hct 30 3    ptt 62, 58  CARDIAC CATH: prox L main: 40% stenosis-IVUS performed  1st obtuse marginal: 70% stenosis, likely culprit for anginal sx  ECHO: pending    ED Treatment:   Medication Administration from 07/30/2018 1357 to 07/30/2018 1847       Date/Time Order Dose Route Action Action by Comments     07/30/2018 1459 aspirin chewable tablet 324 mg 324 mg Oral Given Sonu Serrano RN      07/30/2018 1459 nitroglycerin (NITROSTAT) SL tablet 0 4 mg 0 4 mg Sublingual Given Sonu Serrano RN      07/30/2018 1500 labetalol (NORMODYNE) injection 10 mg 10 mg Intravenous Given Sonu Serrano RN      07/30/2018 1630 nitroglycerin (NITRO-BID) 2 % TD ointment 0 5 inch 0 5 inch Topical Given Sonu Serrano RN      07/30/2018 1748 heparin (porcine) injection 4,000 Units 4,000 Units Intravenous Given Sonu Serrano RN      07/30/2018 1754 heparin (porcine) 25,000 units in 250 mL infusion (premix) 12 Units/kg/hr Intravenous New Bag Sonu Serrano RN             Past Medical History:   Diagnosis Date    BPH (benign prostatic hyperplasia)     Diabetes mellitus ([facility] )     Dyslipidemia     Hypertension        Admitting Diagnosis: Diabetes ([facility] ) [E11 9]  Chest pain [R07 9]  Hypertension [I10]    Age/Sex: 80 y o  male    Assessment/Plan:   Chest pain  Trend troponins - If trending up may need heparin   Will keep NPO pending serial trops and cards eval    If no cath then pharm nuclear stress test       HTN  PRN meds  BP better after lopressor in ED  HEIDY (acute kidney injury) ([facility] )   Assessment & Plan     Baseline Cr unknown  Will trend  Hold nephrotoxic meds         Admission Orders:  Scheduled Meds:   Current Facility-Administered Medications:  acetaminophen 650 mg Oral Q6H PRN amLODIPine 5 mg Oral Daily   aspirin 81 mg Oral Daily   atorvastatin 40 mg Oral Daily With Dinner   carvedilol 3 125 mg Oral BID With Meals   docusate sodium 100 mg Oral BID   heparin (porcine) 3-20 Units/kg/hr (Order-Specific) Intravenous Titrated   heparin (porcine) 2,000 Units Intravenous PRN   heparin (porcine) 4,000 Units Intravenous PRN   insulin detemir 10 Units Subcutaneous HS   insulin lispro 1-5 Units Subcutaneous TID AC   insulin lispro 1-5 Units Subcutaneous HS   latanoprost 1 drop Both Eyes HS   metoprolol tartrate 25 mg Oral Q12H JENISE   nitroglycerin 0 4 mg Sublingual Q5 Min PRN   ondansetron 4 mg Intravenous Q6H PRN   sodium chloride 75 mL/hr Intravenous Once   terazosin 10 mg Oral HS     EKG with trops  Echo  Cardiac cath on 7/31  Cons cardio  Cardiac diet  Trop q3h  a1c in am  SCD's  oob as irma  Gluc checks ac / hs    PER CARDIO 7/30:  Chest pain, NSTEMI  Probably secondary to acute coronary syndrome  Patient currently chest pain-free on nitro patch  Will start heparin drip  Left heart catheterization tomorrow

## 2018-07-31 NOTE — PROGRESS NOTES
Patient transported to MS2 on bed, report given to [facility], care assumed  Assessed right radial and right femoral puncture site with RN, both sites are clean, dry and intact  No signs or symptoms of bleeding or hematoma  Cap refill is brisk in RUE  Distal pulses remain same in B/L LE

## 2018-07-31 NOTE — DISCHARGE SUMMARY
Discharge Summary - [address] Internal Medicine    Patient Information: Dickson Olivas 80 y o  male MRN: [id number]  Unit/Bed#: -01 Encounter: [id number]    Discharging Physician / Practitioner: David Tilley MD  PCP: No primary care provider on file  Admission Date: 7/30/2018  Discharge Date: 07/31/18    Disposition:     Home    Reason for Admission: chest pain    Discharge Diagnoses:     Principal Problem:    Chest pain  Active Problems:    Diabetes ([facility] )    HEIDY (acute kidney injury) ([facility] )    Hypertension    Hyperlipidemia  Resolved Problems:    * No resolved hospital problems  *      Consultations During Hospital Stay:  · cardiology    Procedures Performed:     · none    Significant Findings / Test Results:   Cardiac catheterization-40% stenosis in the proximal left main  70% stenosis in 1st obtuse marginal   This lesion is likely culprit for patient's anginal symptoms  Does not appear amenable to intervention(small vessel)    Incidental Findings:   · None    Test Results Pending at Discharge (will require follow up): · None     Outpatient Tests Requested:  · None    Complications:  None    Hospital Course:     Dickson Olivas is a 80 y o  male patient with past medical history of hypertension, hyperlipidemia, diabetes who originally presented to the hospital on 7/30/2018 due to left-sided chest pressure with radiation to neck and left arm, which has been intermittent for the last 3 days  Patient noted improvement with nitro patch  No prior history of coronary artery disease  EKG showed ischemic changes in the inferior leads, T-wave inversions  Patient also had elevated troponin of 0 07  He was evaluated by Cardiology, later underwent cardiac catheterization which showed 70% stenosis in for  She was marginal, likely believed to be the culprit for patient's anginal symptoms  Cardiology recommended to continue aspirin, statin, beta-blocker as well as Imdur    Recommended to have close outpatient follow-up after discharge  Patient did have acute kidney injury, improved with IV fluid hydration  Symptoms completely resolved, cardiology cleared the patient for discharge  Condition at Discharge: stable     Discharge Day Visit / Exam:     Subjective:    Vitals: Blood Pressure: 141/68 (07/31/18 1300)  Pulse: 55 (07/31/18 1300)  Temperature: 98 2 °F (36 8 °C) (07/31/18 0300)  Temp Source: Oral (07/31/18 0300)  Respirations: 17 (07/31/18 0300)  Height: 5' 9" (175 3 cm) (07/30/18 1854)  Weight - Scale: 85 7 kg (188 lb 15 oz) (07/30/18 1854)  SpO2: 99 % (07/31/18 1300)  Exam:   Physical Exam   Constitutional: He is oriented to person, place, and time  He appears well-developed and well-nourished  HENT:   Head: Normocephalic and atraumatic  Mouth/Throat: Oropharynx is clear and moist    Eyes: EOM are normal  Pupils are equal, round, and reactive to light  Neck: Normal range of motion  Neck supple  Cardiovascular: Normal rate, regular rhythm and normal heart sounds  Pulmonary/Chest: Effort normal and breath sounds normal    Abdominal: Soft  Bowel sounds are normal    Musculoskeletal: Normal range of motion  Neurological: He is alert and oriented to person, place, and time  Skin: Skin is warm and dry  Discussion with Family: family at bedside    Discharge instructions/Information to patient and family:   See after visit summary for information provided to patient and family  Provisions for Follow-Up Care:  See after visit summary for information related to follow-up care and any pertinent home health orders  Planned Readmission: none     Discharge Statement:  I spent 40 minutes discharging the patient  This time was spent on the day of discharge  I had direct contact with the patient on the day of discharge   Greater than 50% of the total time was spent examining patient, answering all patient questions, arranging and discussing plan of care with patient as well as directly providing post-discharge instructions  Additional time then spent on discharge activities  Discharge Medications:  See after visit summary for reconciled discharge medications provided to patient and family        ** Please Note: This note has been constructed using a voice recognition system **

## 2018-07-31 NOTE — SOCIAL WORK
Met with pt at bedside  Pt alert  Pt accompanied by his wife and other family members  CM reviewed and discussed observation status  Pt signed the obs notice  Pt reported that he lives at home with his wife  He said that he is ADL independent  No DME needed  He stated that he has no hx of VNA and SNF  He stated that he uses the [facility] in [facility]  He reported that they refer him to a [facility] in [place]  He stated that his medications are through the [place]  He stated that he takes his medications independently  He said that he has no POA and did not want Advance Directives at this time  He stated that he drives  He said that he has family that will  upon discharge  CM reviewed discharge planning process including the following: identifying help at home, patient preference for discharge planning needs, pharmacy preference, and availability of treatment team to discuss questions or concerns patient and/or family may have regarding understanding medications and recognizing signs and symptoms once discharged  CM also encouraged patient to follow up with all recommended appointments after discharge  Patient advised of importance for patient and family to participate in managing patients medical well being  CM name and role reviewed and Discharge Checklist provided

## 2018-07-31 NOTE — PLAN OF CARE
CARDIOVASCULAR - ADULT     Maintains optimal cardiac output and hemodynamic stability Progressing     Absence of cardiac dysrhythmias or at baseline rhythm Progressing        DISCHARGE PLANNING     Discharge to home or other facility with appropriate resources Progressing        INFECTION - ADULT     Absence or prevention of progression during hospitalization Progressing        Knowledge Deficit     Patient/family/caregiver demonstrates understanding of disease process, treatment plan, medications, and discharge instructions Progressing        METABOLIC, FLUID AND ELECTROLYTES - ADULT     Glucose maintained within target range Progressing        PAIN - ADULT     Verbalizes/displays adequate comfort level or baseline comfort level Progressing        SAFETY ADULT     Patient will remain free of falls Progressing     Maintain or return to baseline ADL function Progressing     Maintain or return mobility status to optimal level Progressing

## 2018-07-31 NOTE — PROGRESS NOTES
Progress Note - Cardiology     Naila Mathis 80 y o  male MRN: [id number]  Unit/Bed#: [facility]-01 Encounter: [id number]      Subjective:   Patient underwent cardiac catheterization today  No recurrence of chest pain  Objective:   Vitals:  Vitals:    07/31/18 1500   BP: 136/72   Pulse: 58   Resp:    Temp: 98 4 °F (36 9 °C)   SpO2: 98%       Body mass index is 27 9 kg/m²  Systolic (41DDM), UQU:318 , Min:131 , JWM:336     Diastolic (71LEQ), LGV:59, Min:62, Max:91      Intake/Output Summary (Last 24 hours) at 07/31/18 1739  Last data filed at 07/31/18 1200   Gross per 24 hour   Intake              660 ml   Output              125 ml   Net              535 ml     Weight (last 2 days)     Date/Time   Weight    07/30/18 1854  85 7 (188 93)    07/30/18 1408  84 4 (186)              PHYSICAL EXAM:  General: Patient is not in acute distress  Head: Normocephalic  Atraumatic  HEENT: Both pupils normal sized, round and reactive to light  Nonicteric  Neck: JVP not raised  Trachea central    Respiratory: Bilateral bronchovascular breath sounds with no crackles or rhonchi  Cardiovascular: RRR  S1 and S2 normal  No murmur, rub or gallop  GI: Abdomen soft, nontender  No guarding or rigidity  Neurologic: Patient is awake, alert, responding to command   Moving all extremities  Integumentary:  No skin rash  Extremities: No clubbing, cyanosis or edema    LABORATORY RESULTS:    Results from last 7 days  Lab Units 07/31/18  0530 07/31/18  0248 07/30/18  2103   TROPONIN I ng/mL 0 07* 0 07* 0 08*     CBC with diff:   Results from last 7 days  Lab Units 07/31/18  0529 07/30/18  2103 07/30/18  1411   WBC Thousand/uL 8 13  --  8 52   HEMOGLOBIN g/dL 9 9*  --  10 6*   HEMATOCRIT % 30 3*  --  32 7*   MCV fL 86  --  85   PLATELETS Thousands/uL 257 250 273   MCH pg 28 0  --  27 7   MCHC g/dL 32 7  --  32 4   RDW % 13 4  --  13 2   MPV fL 10 4 10 2 10 3   NRBC AUTO /100 WBCs  --   --  0       CMP:  Results from last 7 days  Lab Units 18  0529 18  1411   SODIUM mmol/L 141 139   POTASSIUM mmol/L 5 2 4 9   CHLORIDE mmol/L 108 105   CO2 mmol/L 27 26   ANION GAP mmol/L 6 8   BUN mg/dL 20 22   CREATININE mg/dL 1 53* 1 85*   GLUCOSE RANDOM mg/dL 115 241*   CALCIUM mg/dL 8 5 8 6   AST U/L  --  18   ALT U/L  --  23   ALK PHOS U/L  --  76   TOTAL PROTEIN g/dL  --  7 3   BILIRUBIN TOTAL mg/dL  --  0 20   EGFR ml/min/1 73sq m 46 37       BMP:  Results from last 7 days  Lab Units 18  0529 18  1411   SODIUM mmol/L 141 139   POTASSIUM mmol/L 5 2 4 9   CHLORIDE mmol/L 108 105   CO2 mmol/L 27 26   BUN mg/dL 20 22   CREATININE mg/dL 1 53* 1 85*   GLUCOSE RANDOM mg/dL 115 241*   CALCIUM mg/dL 8 5 8 6                Results from last 7 days  Lab Units 18  0529   MAGNESIUM mg/dL 2 0         Results from last 7 days  Lab Units 18  0529   HEMOGLOBIN A1C % 8 6*               Results from last 7 days  Lab Units 18  0529 18  1411   INR  1 05 0 98       Lipid Profile:   Lab Results   Component Value Date    CHOL 146 2018     Lab Results   Component Value Date    HDL 51 2018     Lab Results   Component Value Date    LDLCALC 87 2018     Lab Results   Component Value Date    TRIG 41 2018       Cardiac testing:   Results for orders placed during the hospital encounter of 18   Echo complete with contrast if indicated    Narrative [address] [place] [id number] [phone]    Transthoracic Echocardiogram  2D, M-mode, Doppler, and Color Doppler    Study date:  2018    Patient: Demarcus Parker  MR number: [id number]  Account number: [de-identified]  : 1950  Age: 80 years  Gender: Male  Status: Outpatient  Location: Bedside  Height: 69 in  Weight: 188 lb  BP: 142/ 72 mmHg    Indications: CAD, Assess left ventricular function      Diagnoses: I25 10 - Atherosclerotic heart disease of native coronary artery without angina pectoris    Sonographer:  April [facility] Rosalia Gee  Interpreting Physician:  Nan Jernigan MD  Referring Physician:  Margarita White PA-C  Group:  [facility]    SUMMARY    LEFT VENTRICLE:  Systolic function was normal  Ejection fraction was estimated in the range of 55 % to 65 %  There were no regional wall motion abnormalities  Features were consistent with a pseudonormal left ventricular filling pattern, with concomitant abnormal relaxation and increased filling pressure (grade 2 diastolic dysfunction)  LEFT ATRIUM:  The atrium was mildly dilated  MITRAL VALVE:  There was mild regurgitation  HISTORY: Symptoms: chest pain  PRIOR HISTORY: Palpitations, HEIDY, Risk factors: hypertension, diabetes, and hypercholesterolemia  PROCEDURE: The procedure was performed at the bedside  This was a routine study  The transthoracic approach was used  The study included complete 2D imaging, M-mode, complete spectral Doppler, and color Doppler  The heart rate was 55 bpm,  at the start of the study  Images were obtained from the parasternal, apical, subcostal, and suprasternal notch acoustic windows  Image quality was adequate  LEFT VENTRICLE: Size was normal  Systolic function was normal  Ejection fraction was estimated in the range of 55 % to 65 %  There were no regional wall motion abnormalities  Wall thickness was normal  DOPPLER: Features were consistent  with a pseudonormal left ventricular filling pattern, with concomitant abnormal relaxation and increased filling pressure (grade 2 diastolic dysfunction)  RIGHT VENTRICLE: The size was normal  Systolic function was normal  Wall thickness was normal     LEFT ATRIUM: The atrium was mildly dilated  RIGHT ATRIUM: Size was normal     MITRAL VALVE: Valve structure was normal  There was normal leaflet separation  DOPPLER: The transmitral velocity was within the normal range  There was no evidence for stenosis   There was mild regurgitation  AORTIC VALVE: The valve was trileaflet  Leaflets exhibited normal thickness and normal cuspal separation  DOPPLER: Transaortic velocity was within the normal range  There was no evidence for stenosis  There was no regurgitation  TRICUSPID VALVE: The valve structure was normal  There was normal leaflet separation  DOPPLER: The transtricuspid velocity was within the normal range  There was no evidence for stenosis  There was no regurgitation  PULMONIC VALVE: Leaflets exhibited normal thickness, no calcification, and normal cuspal separation  DOPPLER: The transpulmonic velocity was within the normal range  There was no regurgitation  PERICARDIUM: There was no pericardial effusion  The pericardium was normal in appearance  AORTA: The root exhibited normal size  SYSTEMIC VEINS: IVC: The inferior vena cava was normal in size and course   Respirophasic changes were normal     SYSTEM MEASUREMENT TABLES    2D  %FS: 34 9 %  Ao Diam: 3 4 cm  EDV(Teich): 99 ml  EF(Teich): 64 1 %  ESV(Teich): 35 5 ml  IVSd: 1 cm  LA Area: 24 8 cm2  LA Diam: 3 8 cm  LVEDV MOD A4C: 125 9 ml  LVEF MOD A4C: 62 6 %  LVESV MOD A4C: 47 1 ml  LVIDd: 4 6 cm  LVIDs: 3 cm  LVLd A4C: 9 3 cm  LVLs A4C: 7 9 cm  LVPWd: 0 9 cm  RA Area: 15 2 cm2  RVIDd: 3 1 cm  SV MOD A4C: 78 8 ml  SV(Teich): 63 5 ml    CW  TR Vmax: 2 4 m/s  TR maxP 2 mmHg    MM  TAPSE: 2 1 cm    PW  E': 0 1 m/s  E/E': 8 1  MV A Jeramie: 0 5 m/s  MV Dec McDowell: 3 7 m/s2  MV DecT: 182 9 ms  MV E Jeramie: 0 7 m/s  MV E/A Ratio: 1 3  MV PHT: 53 1 ms  MVA By PHT: 4 1 cm2    [employer] Accredited Echocardiography Laboratory    Prepared and electronically signed by    Teri Brown MD  Signed 2018 16:56:26         Meds/Allergies   all current active meds have been reviewed and current meds:   Current Facility-Administered Medications   Medication Dose Route Frequency    acetaminophen (TYLENOL) tablet 650 mg  650 mg Oral Q6H PRN    amLODIPine (NORVASC) tablet 5 mg  5 mg Oral Daily    aspirin chewable tablet 81 mg  81 mg Oral Daily    atorvastatin (LIPITOR) tablet 40 mg  40 mg Oral Daily With Dinner    carvedilol (COREG) tablet 3 125 mg  3 125 mg Oral BID With Meals    docusate sodium (COLACE) capsule 100 mg  100 mg Oral BID    insulin detemir (LEVEMIR) subcutaneous injection 10 Units  10 Units Subcutaneous HS    insulin lispro (HumaLOG) 100 units/mL subcutaneous injection 1-5 Units  1-5 Units Subcutaneous TID AC    insulin lispro (HumaLOG) 100 units/mL subcutaneous injection 1-5 Units  1-5 Units Subcutaneous HS    isosorbide mononitrate (IMDUR) 24 hr tablet 30 mg  30 mg Oral Daily    latanoprost (XALATAN) 0 005 % ophthalmic solution 1 drop  1 drop Both Eyes HS    metoprolol tartrate (LOPRESSOR) tablet 25 mg  25 mg Oral Q12H [facility]    nitroglycerin (NITROSTAT) SL tablet 0 4 mg  0 4 mg Sublingual Q5 Min PRN    ondansetron (ZOFRAN) injection 4 mg  4 mg Intravenous Q6H PRN    terazosin (HYTRIN) capsule 10 mg  10 mg Oral HS     Prescriptions Prior to Admission   Medication    Cholecalciferol 1000 units TBDP    glipiZIDE (GLUCOTROL) 5 mg tablet    insulin detemir (LEVEMIR) 100 units/mL subcutaneous injection    latanoprost (XALATAN) 0 005 % ophthalmic solution    LISINOPRIL PO    metFORMIN (GLUCOPHAGE) 500 mg tablet    saxagliptin (ONGLYZA) 5 MG tablet    SIMVASTATIN PO    terazosin (HYTRIN) 10 MG capsule              Assessment and Plan:  Chest pain/CAD  -in the setting of elevated troponin 0 07/0 07/0 08/0 07  -cardiac catheterization revealed 40% stenosis of proximal left main, presents as this of 1st obtuse marginal which was not amendable to PCI and diffuse luminal irregularities  -elevated troponin may have been due to demand ischemia secondary to high blood pressure and HEIDY on admission  -echocardiogram with EF of 42-39% grade 2 diastolic dysfunction, mild MR  -patient to be discharged on aspirin, statin, beta-blocker    Will also recommend to start Imdur as chest pain may have been related to ischemia from 1st obtuse marginal     Hypertension  -elevated on admission, now stable     Hyperlipidemia  -started on atorvastatin    Patient may be discharged from cardiac standpoint  Follow up with Dr Chrissy Hardin in 1-2 weeks    ** Please Note: Dragon 360 Dictation voice to text software may have been used in the creation of this document   **

## 2018-07-31 NOTE — DISCHARGE INSTRUCTIONS
Chest Pain   WHAT YOU NEED TO KNOW:   Chest pain can be caused by a range of conditions, from not serious to life-threatening  Chest pain can be a symptom of a digestive problem, such as acid reflux or a stomach ulcer  An anxiety attack or a strong emotion, such as anger, can also cause chest pain  Infection, inflammation, or a fracture in the bones or cartilage in your chest can cause pain or discomfort  Sometimes chest pain or pressure is caused by poor blood flow to your heart (angina)  Chest pain may also be caused by life-threatening conditions such as a heart attack or blood clot in your lungs  DISCHARGE INSTRUCTIONS:   Call 911 if:   · You have any of the following signs of a heart attack:      ¨ Squeezing, pressure, or pain in your chest that lasts longer than 5 minutes or returns    ¨ Discomfort or pain in your back, neck, jaw, stomach, or arm     ¨ Trouble breathing    ¨ Nausea or vomiting    ¨ Lightheadedness or a sudden cold sweat, especially with chest pain or trouble breathing    Return to the emergency department if:   · You have chest discomfort that gets worse, even with medicine  · You cough or vomit blood  · Your bowel movements are black or bloody  · You cannot stop vomiting, or it hurts to swallow  Contact your healthcare provider if:   · You have questions or concerns about your condition or care  Medicines:   · Medicines  may be given to treat the cause of your chest pain  Examples include pain medicine, anxiety medicine, or medicines to increase blood flow to your heart  · Do not take certain medicines without asking your healthcare provider first   These include NSAIDs, herbal or vitamin supplements, or hormones (estrogen or progestin)  · Take your medicine as directed  Contact your healthcare provider if you think your medicine is not helping or if you have side effects  Tell him or her if you are allergic to any medicine   Keep a list of the medicines, vitamins, and herbs you take  Include the amounts, and when and why you take them  Bring the list or the pill bottles to follow-up visits  Carry your medicine list with you in case of an emergency  Follow up with your healthcare provider within 72 hours, or as directed: You may need to return for more tests to find the cause of your chest pain  You may be referred to a specialist, such as a cardiologist or gastroenterologist  Write down your questions so you remember to ask them during your visits  Healthy living tips: The following are general healthy guidelines  If your chest pain is caused by a heart problem, your healthcare provider will give you specific guidelines to follow  · Do not smoke  Nicotine and other chemicals in cigarettes and cigars can cause lung and heart damage  Ask your healthcare provider for information if you currently smoke and need help to quit  E-cigarettes or smokeless tobacco still contain nicotine  Talk to your healthcare provider before you use these products  Eat a variety of healthy, low-fat foods  Healthy foods include fruits, vegetables, whole-grain breads, low-fat dairy products, beans, lean meats, and fish  Ask for more information about a heart healthy diet  · Ask about activity  Your healthcare provider will tell you which activities to limit or avoid  Ask when you can drive, return to work, and have sex  Ask about the best exercise plan for you  · Maintain a healthy weight  Ask your healthcare provider how much you should weigh  Ask him or her to help you create a weight loss plan if you are overweight  · Get the flu and pneumonia vaccines  All adults should get the influenza (flu) vaccine  Get it every year as soon as it becomes available  The pneumococcal vaccine is given to adults aged 72 years or older  The vaccine is given every 5 years to prevent pneumococcal disease, such as pneumonia    © 2017 [employer] Information is for End User's use only and may not be sold, redistributed or otherwise used for commercial purposes  All illustrations and images included in CareNotes® are the copyrighted property of A D A M , Inc  or [employer]  The above information is an  only  It is not intended as medical advice for individual conditions or treatments  Talk to your doctor, nurse or pharmacist before following any medical regimen to see if it is safe and effective for you  Chronic Hypertension, Ambulatory Care   GENERAL INFORMATION:   Chronic hypertension  is a long-term condition in which your blood pressure (BP) is higher than normal  Your BP is the force of your blood moving against the walls of your arteries  Hypertension is a BP of 140/90 or higher  Common symptoms include the following:   · Headache     · Blurred vision    · Chest pain     · Dizziness or weakness     · Trouble breathing     · Nosebleeds  Seek immediate care for the following symptoms:   · Severe headache or vision loss    · Weakness in an arm or leg    · Confusion or difficulty speaking    · Discomfort in your chest that feels like squeezing, pressure, fullness, or pain    · Suddenly feeling lightheaded or trouble breathing    · Pain or discomfort in your back, neck, jaw, stomach, or arm  Treatment for chronic hypertension  may include medicine to lower your BP  You may also need to make lifestyle changes  Take your medicine exactly as directed  Manage chronic hypertension:   · Take your BP at home  Sit and rest for 5 minutes before you take your BP  Extend your arm and support it on a flat surface  Your arm should be at the same level as your heart  Follow the directions that came with your BP monitor  If possible, take at least 2 BP readings each time  Take your BP at least twice a day at the same times each day, such as morning and evening  Keep a log of your BP readings and bring it to your follow-up visits  · Eat less sodium (salt)  Do not add sodium to your food  Limit foods that are high in sodium, such as canned foods, potato chips, and cold cuts  Your healthcare provider may suggest that you follow the [facility] [address]  The plan is low in sodium, unhealthy fats, and total fat  It is high in potassium, calcium, and fiber  · Exercise regularly  Exercise at least 30 minutes per day, on most days of the week  This will help decrease your BP  Ask your healthcare provider about the best exercise plan for you  · Limit alcohol  Women should limit alcohol to 1 drink a day  Men should limit alcohol to 2 drinks a day  A drink of alcohol is 12 ounces of beer, 5 ounces of wine, or 1½ ounces of liquor  · Do not smoke  If you smoke, it is never too late to quit  Smoking can increase your BP  Smoking also worsens other health conditions you may have that can increase your risk for hypertension  Ask your healthcare provider for information if you need help quitting  Follow up with your healthcare provider as directed: You will need to return to have your BP checked and to have other lab tests done  Write down your questions so you remember to ask them during your visits  CARE AGREEMENT:   You have the right to help plan your care  Learn about your health condition and how it may be treated  Discuss treatment options with your caregivers to decide what care you want to receive  You always have the right to refuse treatment  The above information is an  only  It is not intended as medical advice for individual conditions or treatments  Talk to your doctor, nurse or pharmacist before following any medical regimen to see if it is safe and effective for you  © 2014 [address] is for End User's use only and may not be sold, redistributed or otherwise used for commercial purposes   All illustrations and images included in CareNotes® are the copyrighted property of A D A M , Inc  or [employer]

## 2018-08-06 ENCOUNTER — TELEPHONE (OUTPATIENT)
Dept: CARDIOLOGY CLINIC | Facility: CLINIC | Age: 68
End: 2018-08-06

## 2018-12-17 ENCOUNTER — TELEPHONE (OUTPATIENT)
Dept: GASTROENTEROLOGY | Facility: CLINIC | Age: 68
End: 2018-12-17

## 2019-01-03 ENCOUNTER — OFFICE VISIT (OUTPATIENT)
Dept: GASTROENTEROLOGY | Facility: CLINIC | Age: 69
End: 2019-01-03
Payer: MEDICARE

## 2019-01-03 VITALS
HEART RATE: 69 BPM | BODY MASS INDEX: 27.69 KG/M2 | SYSTOLIC BLOOD PRESSURE: 122 MMHG | WEIGHT: 187.5 LBS | DIASTOLIC BLOOD PRESSURE: 78 MMHG

## 2019-01-03 DIAGNOSIS — Z12.11 SCREENING FOR COLON CANCER: Primary | ICD-10-CM

## 2019-01-03 PROCEDURE — 99203 OFFICE O/P NEW LOW 30 MIN: CPT | Performed by: INTERNAL MEDICINE

## 2019-01-03 NOTE — PROGRESS NOTES
[facility]  Cecilia Amber 80 y o  male MRN: [id number]       CC:  New patient to establish for screening colonoscopy    HPI:  Joseph Downing is a 52-year-old male with history of CAD, hypertension, and diabetes  Patient is here to establish with GI for screening colonoscopy by referral of the [address] [place] [address]  Patient denies any change in bowel habits, unintentional weight loss, hematochezia or melena  He denies any upper abdominal symptoms  He denies abdominal pain  Patient reports that his last colon[place] was [place]  We do not have records of this  He was told that he needed a colonoscopy in 10 years at that time  He does not have a family history of colon cancer  Patient was recently admitted over the summer for chest pain  Patient underwent cardiac catheterization for NSTEMI  During cardiac authorization, he did not have any stents placed  He was recommended medical management with aspirin, statin and beta-blocker  Patient tells me that he will be following up with cardiology at the [facility] on January 17th  In the meantime, the patient denies any further episodes of chest pain  Review of Systems:    CONSTITUTIONAL: Denies any fever, chills, or rigors  Good appetite, and no recent weight loss  HEENT: No earache or tinnitus  Denies hearing loss or visual disturbances  CARDIOVASCULAR: No chest pain or palpitations  RESPIRATORY: Denies any cough, hemoptysis, shortness of breath or dyspnea on exertion  GASTROINTESTINAL: As noted in the History of Present Illness  GENITOURINARY: No problems with urination  Denies any hematuria or dysuria  NEUROLOGIC: No dizziness or vertigo, denies headaches  MUSCULOSKELETAL: Denies any muscle or joint pain  SKIN: Denies skin rashes or itching  ENDOCRINE: Denies excessive thirst  Denies intolerance to heat or cold  PSYCHOSOCIAL: Denies depression or anxiety  Denies any recent memory loss         Current Outpatient Prescriptions Medication Sig Dispense Refill    amLODIPine (NORVASC) 5 mg tablet Take 1 tablet (5 mg total) by mouth daily 30 tablet 0    aspirin 81 mg chewable tablet Chew 1 tablet (81 mg total) daily 30 tablet 0    atorvastatin (LIPITOR) 40 mg tablet Take 1 tablet (40 mg total) by mouth daily with dinner 30 tablet 0    carvedilol (COREG) 3 125 mg tablet Take 1 tablet (3 125 mg total) by mouth 2 (two) times a day with meals 30 tablet 0    Cholecalciferol 1000 units TBDP Take by mouth daily      glipiZIDE (GLUCOTROL) 5 mg tablet Take 2 5 mg by mouth daily before dinner      insulin detemir (LEVEMIR) 100 units/mL subcutaneous injection Inject 10 Units under the skin daily at bedtime      isosorbide mononitrate (IMDUR) 30 mg 24 hr tablet Take 1 tablet (30 mg total) by mouth daily 30 tablet 0    latanoprost (XALATAN) 0 005 % ophthalmic solution Administer 1 drop to both eyes daily at bedtime      LISINOPRIL PO Take 20 mg by mouth daily        metFORMIN (GLUCOPHAGE) 500 mg tablet Take 1,000 mg by mouth 2 (two) times a day with meals      nitroglycerin (NITROSTAT) 0 4 mg SL tablet Place 1 tablet (0 4 mg total) under the tongue every 5 (five) minutes as needed for chest pain 90 tablet 0    saxagliptin (ONGLYZA) 5 MG tablet Take 5 mg by mouth daily      terazosin (HYTRIN) 10 MG capsule Take 10 mg by mouth daily at bedtime       No current facility-administered medications for this visit        Past Medical History:   Diagnosis Date    BPH (benign prostatic hyperplasia)     Diabetes mellitus ([facility] [address] )     Dyslipidemia     Hypertension      Past Surgical History:   Procedure Laterality Date    CARDIAC SURGERY  07/30/2018    HERNIA REPAIR       Social History     Social History    Marital status: /Civil Union     Spouse name: N/A    Number of children: N/A    Years of education: N/A     Social History Main Topics    Smoking status: Never Smoker    Smokeless tobacco: Never Used    Alcohol use No    Drug use: No    Sexual activity: Not Asked     Other Topics Concern    None     Social History Narrative    None     Family History   Problem Relation Age of Onset    Heart attack Father [de-identified]            PHYSICAL EXAM:    Vitals:    01/03/19 1309   BP: 122/78   Pulse: 69   Weight: 85 kg (187 lb 8 oz)     General Appearance:   Alert and oriented x 3  Cooperative, and in no respiratory distress   HEENT:   Normocephalic, atraumatic, anicteric      Neck:  Supple, symmetrical, trachea midline   Lungs:   Clear to auscultation bilaterally    Heart[de-identified]   Regular rate and rhythm   Abdomen:   Soft, non-tender, non-distended; normal bowel sounds; no masses, no organomegaly    Genitalia:   Deferred    Rectal:   Deferred    Extremities:  No cyanosis, clubbing or edema    Pulses:  2+ and symmetric all extremities    Skin:  Skin color, texture, turgor normal, no rashes or lesions    Lymph nodes:  No palpable cervical or supraclavicular lymphadenopathy        Lab Results:     Results from last 6 Months  Lab Units 07/31/18  0529  07/30/18  1411   WBC Thousand/uL 8 13  --  8 52   HEMOGLOBIN g/dL 9 9*  --  10 6*   HEMATOCRIT % 30 3*  --  32 7*   PLATELETS Thousands/uL 257  < > 273   NEUTROS PCT %  --   --  60   LYMPHS PCT %  --   --  29   MONOS PCT %  --   --  7   EOS PCT %  --   --  3   < > = values in this interval not displayed  Results from last 6 Months  Lab Units 07/31/18  0529 07/30/18  1411   POTASSIUM mmol/L 5 2 4 9   CHLORIDE mmol/L 108 105   CO2 mmol/L 27 26   BUN mg/dL 20 22   CREATININE mg/dL 1 53* 1 85*   CALCIUM mg/dL 8 5 8 6   ALK PHOS U/L  --  76   ALT U/L  --  23   AST U/L  --  18       Results from last 6 Months  Lab Units 07/31/18  0529   INR  1 05           Imaging Studies: I have personally reviewed pertinent imaging studies  X-ray Chest 2 Views    Result Date: 7/30/2018  Impression: No acute cardiopulmonary disease   Workstation performed: [id number]       ASSESSMENT and PLAN:      1) Need for screening colonoscopy - Patient is due for screening colonoscopy as his last colonoscopy was over 10 years ago  To his knowledge, he does not have a family history of colon cancer or personal history of colon polyps  He denies any alarm symptoms  He was recently hospitalized in July for NSTEMI  He is not on any anticoagulation besides 81 mg aspirin   - We will obtain clearance from his cardiologist at the [place] for colonoscopy  - Likely, we would be able to continue his aspirin    2) CAD - Plan as above

## 2019-01-22 ENCOUNTER — TELEPHONE (OUTPATIENT)
Dept: GASTROENTEROLOGY | Facility: CLINIC | Age: 69
End: 2019-01-22

## 2019-01-22 NOTE — TELEPHONE ENCOUNTER
Contacted patient he stated he has [facility] on [facility] which he will be able to have the clearance and then can schedule his colonoscopy

## 2019-01-22 NOTE — TELEPHONE ENCOUNTER
----- Message from Reji York PA-C sent at 1/21/2019  4:58 PM EST -----  Please call patient to follow up on his cardiologist's recommendations    We may need to get clearance from him     ----- Message -----  From: Reji York PA-C  Sent: 1/3/2019   3:59 PM  To: Reji York PA-C    Please remind me to have office call patient's cardiologist Dr Samira Chapin at the [place] to see whether or not he has colonoscopy clearance

## 2019-03-28 NOTE — TELEPHONE ENCOUNTER
Can you please assist with making this apt? We had to wait until after the 17th for his apt with the Cardiologist to make this follow up apt with Rosario     Thank you!

## 2020-12-27 ENCOUNTER — APPOINTMENT (EMERGENCY)
Dept: RADIOLOGY | Facility: HOSPITAL | Age: 70
End: 2020-12-27
Payer: COMMERCIAL

## 2020-12-27 ENCOUNTER — HOSPITAL ENCOUNTER (OUTPATIENT)
Facility: HOSPITAL | Age: 70
Setting detail: OBSERVATION
Discharge: HOME | End: 2020-12-28
Attending: EMERGENCY MEDICINE | Admitting: STUDENT IN AN ORGANIZED HEALTH CARE EDUCATION/TRAINING PROGRAM
Payer: COMMERCIAL

## 2020-12-27 DIAGNOSIS — I10 HYPERTENSION: ICD-10-CM

## 2020-12-27 DIAGNOSIS — D64.9 ANEMIA: ICD-10-CM

## 2020-12-27 DIAGNOSIS — N28.9 ACUTE RENAL INSUFFICIENCY: ICD-10-CM

## 2020-12-27 DIAGNOSIS — I25.10 CAD (CORONARY ARTERY DISEASE): ICD-10-CM

## 2020-12-27 DIAGNOSIS — R07.9 CHEST PAIN, UNSPECIFIED TYPE: Primary | ICD-10-CM

## 2020-12-27 PROBLEM — IMO0002 UNCONTROLLED TYPE 2 DIABETES MELLITUS: Status: ACTIVE | Noted: 2020-12-27

## 2020-12-27 LAB
ALBUMIN SERPL BCP-MCNC: 3.3 G/DL (ref 3.5–5)
ALP SERPL-CCNC: 66 U/L (ref 46–116)
ALT SERPL W P-5'-P-CCNC: 30 U/L (ref 12–78)
ANION GAP SERPL CALCULATED.3IONS-SCNC: 11 MMOL/L (ref 4–13)
AST SERPL W P-5'-P-CCNC: 25 U/L (ref 5–45)
ATRIAL RATE: 67 BPM
ATRIAL RATE: 68 BPM
ATRIAL RATE: 68 BPM
BASOPHILS # BLD AUTO: 0.06 THOUSANDS/ΜL (ref 0–0.1)
BASOPHILS NFR BLD AUTO: 1 % (ref 0–1)
BILIRUB SERPL-MCNC: 0.2 MG/DL (ref 0.2–1)
BUN SERPL-MCNC: 24 MG/DL (ref 5–25)
CALCIUM ALBUM COR SERPL-MCNC: 9.1 MG/DL (ref 8.3–10.1)
CALCIUM SERPL-MCNC: 8.5 MG/DL (ref 8.3–10.1)
CHLORIDE SERPL-SCNC: 104 MMOL/L (ref 100–108)
CO2 SERPL-SCNC: 25 MMOL/L (ref 21–32)
CREAT SERPL-MCNC: 1.97 MG/DL (ref 0.6–1.3)
EOSINOPHIL # BLD AUTO: 0.32 THOUSAND/ΜL (ref 0–0.61)
EOSINOPHIL NFR BLD AUTO: 4 % (ref 0–6)
ERYTHROCYTE [DISTWIDTH] IN BLOOD BY AUTOMATED COUNT: 14 % (ref 11.6–15.1)
EST. AVERAGE GLUCOSE BLD GHB EST-MCNC: 171 MG/DL
GFR SERPL CREATININE-BSD FRML MDRD: 34 ML/MIN/1.73SQ M
GLUCOSE SERPL-MCNC: 121 MG/DL (ref 65–140)
GLUCOSE SERPL-MCNC: 149 MG/DL (ref 65–140)
GLUCOSE SERPL-MCNC: 152 MG/DL (ref 65–140)
GLUCOSE SERPL-MCNC: 158 MG/DL (ref 65–140)
GLUCOSE SERPL-MCNC: 99 MG/DL (ref 65–140)
HBA1C MFR BLD: 7.6 %
HCT VFR BLD AUTO: 29.7 % (ref 36.5–49.3)
HGB BLD-MCNC: 9.5 G/DL (ref 12–17)
IMM GRANULOCYTES # BLD AUTO: 0.02 THOUSAND/UL (ref 0–0.2)
IMM GRANULOCYTES NFR BLD AUTO: 0 % (ref 0–2)
LYMPHOCYTES # BLD AUTO: 2 THOUSANDS/ΜL (ref 0.6–4.47)
LYMPHOCYTES NFR BLD AUTO: 24 % (ref 14–44)
MCH RBC QN AUTO: 27.6 PG (ref 26.8–34.3)
MCHC RBC AUTO-ENTMCNC: 32 G/DL (ref 31.4–37.4)
MCV RBC AUTO: 86 FL (ref 82–98)
MONOCYTES # BLD AUTO: 0.57 THOUSAND/ΜL (ref 0.17–1.22)
MONOCYTES NFR BLD AUTO: 7 % (ref 4–12)
NEUTROPHILS # BLD AUTO: 5.22 THOUSANDS/ΜL (ref 1.85–7.62)
NEUTS SEG NFR BLD AUTO: 64 % (ref 43–75)
NRBC BLD AUTO-RTO: 0 /100 WBCS
P AXIS: 49 DEGREES
P AXIS: 54 DEGREES
P AXIS: 59 DEGREES
PLATELET # BLD AUTO: 214 THOUSANDS/UL (ref 149–390)
PLATELET # BLD AUTO: 219 THOUSANDS/UL (ref 149–390)
PMV BLD AUTO: 10 FL (ref 8.9–12.7)
PMV BLD AUTO: 10.3 FL (ref 8.9–12.7)
POTASSIUM SERPL-SCNC: 5.1 MMOL/L (ref 3.5–5.3)
PR INTERVAL: 144 MS
PR INTERVAL: 148 MS
PR INTERVAL: 152 MS
PROT SERPL-MCNC: 6.7 G/DL (ref 6.4–8.2)
QRS AXIS: 43 DEGREES
QRS AXIS: 51 DEGREES
QRS AXIS: 64 DEGREES
QRSD INTERVAL: 88 MS
QRSD INTERVAL: 88 MS
QRSD INTERVAL: 96 MS
QT INTERVAL: 364 MS
QT INTERVAL: 370 MS
QT INTERVAL: 372 MS
QTC INTERVAL: 384 MS
QTC INTERVAL: 393 MS
QTC INTERVAL: 395 MS
RBC # BLD AUTO: 3.44 MILLION/UL (ref 3.88–5.62)
SODIUM SERPL-SCNC: 140 MMOL/L (ref 136–145)
T WAVE AXIS: 12 DEGREES
T WAVE AXIS: 22 DEGREES
T WAVE AXIS: 32 DEGREES
TROPONIN I SERPL-MCNC: 0.02 NG/ML
TROPONIN I SERPL-MCNC: 0.03 NG/ML
TROPONIN I SERPL-MCNC: 0.03 NG/ML
VENTRICULAR RATE: 67 BPM
VENTRICULAR RATE: 68 BPM
VENTRICULAR RATE: 68 BPM
WBC # BLD AUTO: 8.19 THOUSAND/UL (ref 4.31–10.16)

## 2020-12-27 PROCEDURE — 85025 COMPLETE CBC W/AUTO DIFF WBC: CPT | Performed by: EMERGENCY MEDICINE

## 2020-12-27 PROCEDURE — 84484 ASSAY OF TROPONIN QUANT: CPT | Performed by: PHYSICIAN ASSISTANT

## 2020-12-27 PROCEDURE — 84484 ASSAY OF TROPONIN QUANT: CPT | Performed by: EMERGENCY MEDICINE

## 2020-12-27 PROCEDURE — 99220 PR INITIAL OBSERVATION CARE/DAY 70 MINUTES: CPT | Performed by: STUDENT IN AN ORGANIZED HEALTH CARE EDUCATION/TRAINING PROGRAM

## 2020-12-27 PROCEDURE — 93005 ELECTROCARDIOGRAM TRACING: CPT

## 2020-12-27 PROCEDURE — 36415 COLL VENOUS BLD VENIPUNCTURE: CPT | Performed by: EMERGENCY MEDICINE

## 2020-12-27 PROCEDURE — 99285 EMERGENCY DEPT VISIT HI MDM: CPT

## 2020-12-27 PROCEDURE — 80053 COMPREHEN METABOLIC PANEL: CPT | Performed by: EMERGENCY MEDICINE

## 2020-12-27 PROCEDURE — 83036 HEMOGLOBIN GLYCOSYLATED A1C: CPT | Performed by: PHYSICIAN ASSISTANT

## 2020-12-27 PROCEDURE — 82948 REAGENT STRIP/BLOOD GLUCOSE: CPT

## 2020-12-27 PROCEDURE — 93010 ELECTROCARDIOGRAM REPORT: CPT | Performed by: INTERNAL MEDICINE

## 2020-12-27 PROCEDURE — 99285 EMERGENCY DEPT VISIT HI MDM: CPT | Performed by: EMERGENCY MEDICINE

## 2020-12-27 PROCEDURE — 71045 X-RAY EXAM CHEST 1 VIEW: CPT

## 2020-12-27 PROCEDURE — 85049 AUTOMATED PLATELET COUNT: CPT | Performed by: PHYSICIAN ASSISTANT

## 2020-12-27 RX ORDER — ASPIRIN 81 MG/1
324 TABLET, CHEWABLE ORAL ONCE
Status: COMPLETED | OUTPATIENT
Start: 2020-12-27 | End: 2020-12-27

## 2020-12-27 RX ORDER — HEPARIN SODIUM 5000 [USP'U]/ML
5000 INJECTION, SOLUTION INTRAVENOUS; SUBCUTANEOUS EVERY 8 HOURS SCHEDULED
Status: DISCONTINUED | OUTPATIENT
Start: 2020-12-27 | End: 2020-12-28 | Stop reason: HOSPADM

## 2020-12-27 RX ORDER — AMLODIPINE BESYLATE 5 MG/1
5 TABLET ORAL DAILY
Status: DISCONTINUED | OUTPATIENT
Start: 2020-12-27 | End: 2020-12-28 | Stop reason: HOSPADM

## 2020-12-27 RX ORDER — CARVEDILOL 6.25 MG/1
6.25 TABLET ORAL 2 TIMES DAILY WITH MEALS
Status: DISCONTINUED | OUTPATIENT
Start: 2020-12-27 | End: 2020-12-28 | Stop reason: HOSPADM

## 2020-12-27 RX ORDER — LIDOCAINE 50 MG/G
1 PATCH TOPICAL DAILY PRN
Status: DISCONTINUED | OUTPATIENT
Start: 2020-12-27 | End: 2020-12-28 | Stop reason: HOSPADM

## 2020-12-27 RX ORDER — SODIUM CHLORIDE 9 MG/ML
50 INJECTION, SOLUTION INTRAVENOUS CONTINUOUS
Status: DISPENSED | OUTPATIENT
Start: 2020-12-27 | End: 2020-12-27

## 2020-12-27 RX ORDER — ATORVASTATIN CALCIUM 40 MG/1
40 TABLET, FILM COATED ORAL
Status: DISCONTINUED | OUTPATIENT
Start: 2020-12-27 | End: 2020-12-28 | Stop reason: HOSPADM

## 2020-12-27 RX ORDER — AMLODIPINE BESYLATE 5 MG/1
5 TABLET ORAL ONCE
Status: COMPLETED | OUTPATIENT
Start: 2020-12-27 | End: 2020-12-27

## 2020-12-27 RX ORDER — ASPIRIN 81 MG/1
81 TABLET, CHEWABLE ORAL DAILY
Status: DISCONTINUED | OUTPATIENT
Start: 2020-12-27 | End: 2020-12-28 | Stop reason: HOSPADM

## 2020-12-27 RX ORDER — ISOSORBIDE MONONITRATE 30 MG/1
30 TABLET, EXTENDED RELEASE ORAL DAILY
Status: DISCONTINUED | OUTPATIENT
Start: 2020-12-27 | End: 2020-12-28 | Stop reason: HOSPADM

## 2020-12-27 RX ORDER — ACETAMINOPHEN 325 MG/1
650 TABLET ORAL EVERY 6 HOURS PRN
Status: DISCONTINUED | OUTPATIENT
Start: 2020-12-27 | End: 2020-12-28 | Stop reason: HOSPADM

## 2020-12-27 RX ORDER — NITROGLYCERIN 0.4 MG/1
0.4 TABLET SUBLINGUAL ONCE
Status: COMPLETED | OUTPATIENT
Start: 2020-12-27 | End: 2020-12-27

## 2020-12-27 RX ORDER — LATANOPROST 50 UG/ML
1 SOLUTION/ DROPS OPHTHALMIC
Status: DISCONTINUED | OUTPATIENT
Start: 2020-12-27 | End: 2020-12-28 | Stop reason: HOSPADM

## 2020-12-27 RX ORDER — NITROGLYCERIN 0.4 MG/1
0.4 TABLET SUBLINGUAL
Status: DISCONTINUED | OUTPATIENT
Start: 2020-12-27 | End: 2020-12-28 | Stop reason: HOSPADM

## 2020-12-27 RX ORDER — CARVEDILOL 3.12 MG/1
3.12 TABLET ORAL 2 TIMES DAILY WITH MEALS
Status: DISCONTINUED | OUTPATIENT
Start: 2020-12-27 | End: 2020-12-27

## 2020-12-27 RX ORDER — TERAZOSIN 5 MG/1
10 CAPSULE ORAL
Status: DISCONTINUED | OUTPATIENT
Start: 2020-12-27 | End: 2020-12-28 | Stop reason: HOSPADM

## 2020-12-27 RX ADMIN — AMLODIPINE BESYLATE 5 MG: 5 TABLET ORAL at 08:58

## 2020-12-27 RX ADMIN — ATORVASTATIN CALCIUM 40 MG: 40 TABLET, FILM COATED ORAL at 16:36

## 2020-12-27 RX ADMIN — NITROGLYCERIN 0.4 MG: 0.4 TABLET SUBLINGUAL at 01:43

## 2020-12-27 RX ADMIN — ASPIRIN 81 MG CHEWABLE TABLET 81 MG: 81 TABLET CHEWABLE at 08:58

## 2020-12-27 RX ADMIN — AMLODIPINE BESYLATE 5 MG: 5 TABLET ORAL at 10:59

## 2020-12-27 RX ADMIN — HEPARIN SODIUM 5000 UNITS: 5000 INJECTION INTRAVENOUS; SUBCUTANEOUS at 06:09

## 2020-12-27 RX ADMIN — ISOSORBIDE MONONITRATE 30 MG: 30 TABLET, EXTENDED RELEASE ORAL at 08:58

## 2020-12-27 RX ADMIN — CARVEDILOL 3.12 MG: 3.12 TABLET, FILM COATED ORAL at 07:31

## 2020-12-27 RX ADMIN — INSULIN DETEMIR 10 UNITS: 100 INJECTION, SOLUTION SUBCUTANEOUS at 21:16

## 2020-12-27 RX ADMIN — ASPIRIN 324 MG: 81 TABLET, CHEWABLE ORAL at 01:08

## 2020-12-27 RX ADMIN — SODIUM CHLORIDE 50 ML/HR: 0.9 INJECTION, SOLUTION INTRAVENOUS at 06:09

## 2020-12-27 RX ADMIN — HEPARIN SODIUM 5000 UNITS: 5000 INJECTION INTRAVENOUS; SUBCUTANEOUS at 14:25

## 2020-12-27 RX ADMIN — TERAZOSIN HYDROCHLORIDE ANHYDROUS 10 MG: 5 CAPSULE ORAL at 21:15

## 2020-12-27 RX ADMIN — INSULIN LISPRO 1 UNITS: 100 INJECTION, SOLUTION INTRAVENOUS; SUBCUTANEOUS at 21:16

## 2020-12-27 RX ADMIN — HEPARIN SODIUM 5000 UNITS: 5000 INJECTION INTRAVENOUS; SUBCUTANEOUS at 21:15

## 2020-12-27 RX ADMIN — CARVEDILOL 6.25 MG: 6.25 TABLET, FILM COATED ORAL at 16:35

## 2020-12-28 ENCOUNTER — APPOINTMENT (OUTPATIENT)
Dept: NON INVASIVE DIAGNOSTICS | Facility: HOSPITAL | Age: 70
End: 2020-12-28
Payer: COMMERCIAL

## 2020-12-28 ENCOUNTER — APPOINTMENT (OUTPATIENT)
Dept: NUCLEAR MEDICINE | Facility: HOSPITAL | Age: 70
End: 2020-12-28
Payer: COMMERCIAL

## 2020-12-28 VITALS
TEMPERATURE: 98.7 F | HEIGHT: 69 IN | OXYGEN SATURATION: 98 % | DIASTOLIC BLOOD PRESSURE: 78 MMHG | BODY MASS INDEX: 27.85 KG/M2 | WEIGHT: 188.05 LBS | SYSTOLIC BLOOD PRESSURE: 140 MMHG | HEART RATE: 71 BPM | RESPIRATION RATE: 18 BRPM

## 2020-12-28 LAB
ANION GAP SERPL CALCULATED.3IONS-SCNC: 5 MMOL/L (ref 4–13)
ARRHY DURING EX: NORMAL
BASOPHILS # BLD AUTO: 0.04 THOUSANDS/ΜL (ref 0–0.1)
BASOPHILS NFR BLD AUTO: 1 % (ref 0–1)
BUN SERPL-MCNC: 22 MG/DL (ref 5–25)
CALCIUM SERPL-MCNC: 8.3 MG/DL (ref 8.3–10.1)
CHEST PAIN STATEMENT: NORMAL
CHLORIDE SERPL-SCNC: 108 MMOL/L (ref 100–108)
CO2 SERPL-SCNC: 28 MMOL/L (ref 21–32)
CREAT SERPL-MCNC: 1.48 MG/DL (ref 0.6–1.3)
EOSINOPHIL # BLD AUTO: 0.18 THOUSAND/ΜL (ref 0–0.61)
EOSINOPHIL NFR BLD AUTO: 3 % (ref 0–6)
ERYTHROCYTE [DISTWIDTH] IN BLOOD BY AUTOMATED COUNT: 13.7 % (ref 11.6–15.1)
GFR SERPL CREATININE-BSD FRML MDRD: 48 ML/MIN/1.73SQ M
GLUCOSE P FAST SERPL-MCNC: 117 MG/DL (ref 65–99)
GLUCOSE SERPL-MCNC: 102 MG/DL (ref 65–140)
GLUCOSE SERPL-MCNC: 117 MG/DL (ref 65–140)
GLUCOSE SERPL-MCNC: 95 MG/DL (ref 65–140)
HCT VFR BLD AUTO: 29.2 % (ref 36.5–49.3)
HGB BLD-MCNC: 9.5 G/DL (ref 12–17)
IMM GRANULOCYTES # BLD AUTO: 0.02 THOUSAND/UL (ref 0–0.2)
IMM GRANULOCYTES NFR BLD AUTO: 0 % (ref 0–2)
LYMPHOCYTES # BLD AUTO: 1.75 THOUSANDS/ΜL (ref 0.6–4.47)
LYMPHOCYTES NFR BLD AUTO: 27 % (ref 14–44)
MAX DIASTOLIC BP: 59 MMHG
MAX HEART RATE: 72 BPM
MAX PREDICTED HEART RATE: 151 BPM
MAX. SYSTOLIC BP: 121 MMHG
MCH RBC QN AUTO: 27.6 PG (ref 26.8–34.3)
MCHC RBC AUTO-ENTMCNC: 32.5 G/DL (ref 31.4–37.4)
MCV RBC AUTO: 85 FL (ref 82–98)
MONOCYTES # BLD AUTO: 0.56 THOUSAND/ΜL (ref 0.17–1.22)
MONOCYTES NFR BLD AUTO: 9 % (ref 4–12)
NEUTROPHILS # BLD AUTO: 3.85 THOUSANDS/ΜL (ref 1.85–7.62)
NEUTS SEG NFR BLD AUTO: 60 % (ref 43–75)
NRBC BLD AUTO-RTO: 0 /100 WBCS
PLATELET # BLD AUTO: 219 THOUSANDS/UL (ref 149–390)
PMV BLD AUTO: 10.1 FL (ref 8.9–12.7)
POTASSIUM SERPL-SCNC: 4.7 MMOL/L (ref 3.5–5.3)
PROTOCOL NAME: NORMAL
RBC # BLD AUTO: 3.44 MILLION/UL (ref 3.88–5.62)
REASON FOR TERMINATION: NORMAL
SODIUM SERPL-SCNC: 141 MMOL/L (ref 136–145)
TARGET HR FORMULA: NORMAL
TEST INDICATION: NORMAL
TIME IN EXERCISE PHASE: NORMAL
WBC # BLD AUTO: 6.4 THOUSAND/UL (ref 4.31–10.16)

## 2020-12-28 PROCEDURE — 82948 REAGENT STRIP/BLOOD GLUCOSE: CPT

## 2020-12-28 PROCEDURE — 93017 CV STRESS TEST TRACING ONLY: CPT

## 2020-12-28 PROCEDURE — A9502 TC99M TETROFOSMIN: HCPCS

## 2020-12-28 PROCEDURE — 93016 CV STRESS TEST SUPVJ ONLY: CPT | Performed by: INTERNAL MEDICINE

## 2020-12-28 PROCEDURE — 99217 PR OBSERVATION CARE DISCHARGE MANAGEMENT: CPT | Performed by: FAMILY MEDICINE

## 2020-12-28 PROCEDURE — G1004 CDSM NDSC: HCPCS

## 2020-12-28 PROCEDURE — 93018 CV STRESS TEST I&R ONLY: CPT | Performed by: INTERNAL MEDICINE

## 2020-12-28 PROCEDURE — 78452 HT MUSCLE IMAGE SPECT MULT: CPT

## 2020-12-28 PROCEDURE — 85025 COMPLETE CBC W/AUTO DIFF WBC: CPT | Performed by: STUDENT IN AN ORGANIZED HEALTH CARE EDUCATION/TRAINING PROGRAM

## 2020-12-28 PROCEDURE — 78452 HT MUSCLE IMAGE SPECT MULT: CPT | Performed by: INTERNAL MEDICINE

## 2020-12-28 PROCEDURE — 80048 BASIC METABOLIC PNL TOTAL CA: CPT | Performed by: STUDENT IN AN ORGANIZED HEALTH CARE EDUCATION/TRAINING PROGRAM

## 2020-12-28 RX ORDER — CARVEDILOL 3.12 MG/1
6.25 TABLET ORAL 2 TIMES DAILY WITH MEALS
Qty: 30 TABLET | Refills: 0 | Status: SHIPPED | OUTPATIENT
Start: 2020-12-28

## 2020-12-28 RX ADMIN — CARVEDILOL 6.25 MG: 6.25 TABLET, FILM COATED ORAL at 08:19

## 2020-12-28 RX ADMIN — REGADENOSON 0.4 MG: 0.08 INJECTION, SOLUTION INTRAVENOUS at 12:36

## 2020-12-28 RX ADMIN — ISOSORBIDE MONONITRATE 30 MG: 30 TABLET, EXTENDED RELEASE ORAL at 08:19

## 2020-12-28 RX ADMIN — ASPIRIN 81 MG CHEWABLE TABLET 81 MG: 81 TABLET CHEWABLE at 08:19

## 2020-12-28 RX ADMIN — HEPARIN SODIUM 5000 UNITS: 5000 INJECTION INTRAVENOUS; SUBCUTANEOUS at 06:28

## 2020-12-28 RX ADMIN — AMLODIPINE BESYLATE 5 MG: 5 TABLET ORAL at 08:19

## 2021-12-11 ENCOUNTER — APPOINTMENT (EMERGENCY)
Dept: RADIOLOGY | Facility: HOSPITAL | Age: 71
End: 2021-12-11
Payer: COMMERCIAL

## 2021-12-11 ENCOUNTER — HOSPITAL ENCOUNTER (EMERGENCY)
Facility: HOSPITAL | Age: 71
Discharge: HOME | End: 2021-12-11
Attending: EMERGENCY MEDICINE
Payer: COMMERCIAL

## 2021-12-11 VITALS
WEIGHT: 188.49 LBS | HEIGHT: 69 IN | TEMPERATURE: 97.8 F | BODY MASS INDEX: 27.92 KG/M2 | DIASTOLIC BLOOD PRESSURE: 64 MMHG | HEART RATE: 66 BPM | RESPIRATION RATE: 18 BRPM | OXYGEN SATURATION: 98 % | SYSTOLIC BLOOD PRESSURE: 122 MMHG

## 2021-12-11 DIAGNOSIS — R07.9 CHEST PAIN, UNSPECIFIED TYPE: Primary | ICD-10-CM

## 2021-12-11 LAB
2HR DELTA HS TROPONIN: -1 NG/L
ALBUMIN SERPL BCP-MCNC: 3.3 G/DL (ref 3.5–5)
ALP SERPL-CCNC: 79 U/L (ref 46–116)
ALT SERPL W P-5'-P-CCNC: 21 U/L (ref 12–78)
ANION GAP SERPL CALCULATED.3IONS-SCNC: 9 MMOL/L (ref 4–13)
APTT PPP: 28 SECONDS (ref 23–37)
AST SERPL W P-5'-P-CCNC: 16 U/L (ref 5–45)
BASOPHILS # BLD AUTO: 0.06 THOUSANDS/ΜL (ref 0–0.1)
BASOPHILS NFR BLD AUTO: 1 % (ref 0–1)
BILIRUB SERPL-MCNC: 0.25 MG/DL (ref 0.2–1)
BUN SERPL-MCNC: 29 MG/DL (ref 5–25)
CALCIUM ALBUM COR SERPL-MCNC: 8.8 MG/DL (ref 8.3–10.1)
CALCIUM SERPL-MCNC: 8.2 MG/DL (ref 8.3–10.1)
CARDIAC TROPONIN I PNL SERPL HS: 5 NG/L
CARDIAC TROPONIN I PNL SERPL HS: 6 NG/L
CHLORIDE SERPL-SCNC: 104 MMOL/L (ref 100–108)
CO2 SERPL-SCNC: 27 MMOL/L (ref 21–32)
CREAT SERPL-MCNC: 1.86 MG/DL (ref 0.6–1.3)
EOSINOPHIL # BLD AUTO: 0.26 THOUSAND/ΜL (ref 0–0.61)
EOSINOPHIL NFR BLD AUTO: 3 % (ref 0–6)
ERYTHROCYTE [DISTWIDTH] IN BLOOD BY AUTOMATED COUNT: 13.1 % (ref 11.6–15.1)
GFR SERPL CREATININE-BSD FRML MDRD: 36 ML/MIN/1.73SQ M
GLUCOSE SERPL-MCNC: 203 MG/DL (ref 65–140)
HCT VFR BLD AUTO: 32.9 % (ref 36.5–49.3)
HGB BLD-MCNC: 10.5 G/DL (ref 12–17)
IMM GRANULOCYTES # BLD AUTO: 0.01 THOUSAND/UL (ref 0–0.2)
IMM GRANULOCYTES NFR BLD AUTO: 0 % (ref 0–2)
LYMPHOCYTES # BLD AUTO: 1.84 THOUSANDS/ΜL (ref 0.6–4.47)
LYMPHOCYTES NFR BLD AUTO: 23 % (ref 14–44)
MAGNESIUM SERPL-MCNC: 1.8 MG/DL (ref 1.6–2.6)
MCH RBC QN AUTO: 28.6 PG (ref 26.8–34.3)
MCHC RBC AUTO-ENTMCNC: 31.9 G/DL (ref 31.4–37.4)
MCV RBC AUTO: 90 FL (ref 82–98)
MONOCYTES # BLD AUTO: 0.55 THOUSAND/ΜL (ref 0.17–1.22)
MONOCYTES NFR BLD AUTO: 7 % (ref 4–12)
NEUTROPHILS # BLD AUTO: 5.28 THOUSANDS/ΜL (ref 1.85–7.62)
NEUTS SEG NFR BLD AUTO: 66 % (ref 43–75)
NRBC BLD AUTO-RTO: 0 /100 WBCS
NT-PROBNP SERPL-MCNC: 160 PG/ML
PLATELET # BLD AUTO: 236 THOUSANDS/UL (ref 149–390)
PMV BLD AUTO: 10.7 FL (ref 8.9–12.7)
POTASSIUM SERPL-SCNC: 4.8 MMOL/L (ref 3.5–5.3)
PROT SERPL-MCNC: 6.8 G/DL (ref 6.4–8.2)
RBC # BLD AUTO: 3.67 MILLION/UL (ref 3.88–5.62)
SODIUM SERPL-SCNC: 140 MMOL/L (ref 136–145)
WBC # BLD AUTO: 8 THOUSAND/UL (ref 4.31–10.16)

## 2021-12-11 PROCEDURE — 93005 ELECTROCARDIOGRAM TRACING: CPT

## 2021-12-11 PROCEDURE — 85730 THROMBOPLASTIN TIME PARTIAL: CPT | Performed by: EMERGENCY MEDICINE

## 2021-12-11 PROCEDURE — 36415 COLL VENOUS BLD VENIPUNCTURE: CPT | Performed by: EMERGENCY MEDICINE

## 2021-12-11 PROCEDURE — 96361 HYDRATE IV INFUSION ADD-ON: CPT

## 2021-12-11 PROCEDURE — 83880 ASSAY OF NATRIURETIC PEPTIDE: CPT | Performed by: EMERGENCY MEDICINE

## 2021-12-11 PROCEDURE — 80053 COMPREHEN METABOLIC PANEL: CPT | Performed by: EMERGENCY MEDICINE

## 2021-12-11 PROCEDURE — 84484 ASSAY OF TROPONIN QUANT: CPT | Performed by: EMERGENCY MEDICINE

## 2021-12-11 PROCEDURE — 71045 X-RAY EXAM CHEST 1 VIEW: CPT

## 2021-12-11 PROCEDURE — 83735 ASSAY OF MAGNESIUM: CPT | Performed by: EMERGENCY MEDICINE

## 2021-12-11 PROCEDURE — 99285 EMERGENCY DEPT VISIT HI MDM: CPT

## 2021-12-11 PROCEDURE — 85025 COMPLETE CBC W/AUTO DIFF WBC: CPT | Performed by: EMERGENCY MEDICINE

## 2021-12-11 PROCEDURE — 96374 THER/PROPH/DIAG INJ IV PUSH: CPT

## 2021-12-11 PROCEDURE — 99285 EMERGENCY DEPT VISIT HI MDM: CPT | Performed by: EMERGENCY MEDICINE

## 2021-12-11 RX ORDER — FENTANYL CITRATE 50 UG/ML
50 INJECTION, SOLUTION INTRAMUSCULAR; INTRAVENOUS ONCE
Status: COMPLETED | OUTPATIENT
Start: 2021-12-11 | End: 2021-12-11

## 2021-12-11 RX ADMIN — FENTANYL CITRATE 50 MCG: 50 INJECTION INTRAMUSCULAR; INTRAVENOUS at 04:51

## 2021-12-11 RX ADMIN — SODIUM CHLORIDE 1000 ML: 0.9 INJECTION, SOLUTION INTRAVENOUS at 03:22

## 2021-12-13 LAB
ATRIAL RATE: 77 BPM
P AXIS: 57 DEGREES
PR INTERVAL: 146 MS
QRS AXIS: 61 DEGREES
QRSD INTERVAL: 92 MS
QT INTERVAL: 374 MS
QTC INTERVAL: 423 MS
T WAVE AXIS: 11 DEGREES
VENTRICULAR RATE: 77 BPM

## 2021-12-13 PROCEDURE — 93010 ELECTROCARDIOGRAM REPORT: CPT | Performed by: INTERNAL MEDICINE

## 2022-01-02 PROCEDURE — 99284 EMERGENCY DEPT VISIT MOD MDM: CPT | Performed by: EMERGENCY MEDICINE

## 2022-01-02 PROCEDURE — 99285 EMERGENCY DEPT VISIT HI MDM: CPT

## 2022-01-03 ENCOUNTER — HOSPITAL ENCOUNTER (EMERGENCY)
Facility: HOSPITAL | Age: 72
Discharge: HOME | End: 2022-01-03
Attending: EMERGENCY MEDICINE | Admitting: EMERGENCY MEDICINE
Payer: COMMERCIAL

## 2022-01-03 VITALS
SYSTOLIC BLOOD PRESSURE: 146 MMHG | TEMPERATURE: 97.6 F | HEART RATE: 71 BPM | RESPIRATION RATE: 20 BRPM | OXYGEN SATURATION: 98 % | DIASTOLIC BLOOD PRESSURE: 75 MMHG

## 2022-01-03 DIAGNOSIS — R07.9 CHEST PAIN: ICD-10-CM

## 2022-01-03 DIAGNOSIS — N17.9 AKI (ACUTE KIDNEY INJURY) (HCC): ICD-10-CM

## 2022-01-03 DIAGNOSIS — U07.1 COVID-19: Primary | ICD-10-CM

## 2022-01-03 LAB
2HR DELTA HS TROPONIN: -2 NG/L
4HR DELTA HS TROPONIN: -1 NG/L
ALBUMIN SERPL BCP-MCNC: 2.6 G/DL (ref 3.5–5)
ALP SERPL-CCNC: 72 U/L (ref 46–116)
ALT SERPL W P-5'-P-CCNC: 26 U/L (ref 12–78)
ANION GAP SERPL CALCULATED.3IONS-SCNC: 10 MMOL/L (ref 4–13)
ANION GAP SERPL CALCULATED.3IONS-SCNC: 10 MMOL/L (ref 4–13)
AST SERPL W P-5'-P-CCNC: 23 U/L (ref 5–45)
ATRIAL RATE: 73 BPM
BASOPHILS # BLD AUTO: 0.01 THOUSANDS/ΜL (ref 0–0.1)
BASOPHILS NFR BLD AUTO: 0 % (ref 0–1)
BILIRUB SERPL-MCNC: 0.22 MG/DL (ref 0.2–1)
BUN SERPL-MCNC: 40 MG/DL (ref 5–25)
BUN SERPL-MCNC: 43 MG/DL (ref 5–25)
CALCIUM ALBUM COR SERPL-MCNC: 9.2 MG/DL (ref 8.3–10.1)
CALCIUM SERPL-MCNC: 7.7 MG/DL (ref 8.3–10.1)
CALCIUM SERPL-MCNC: 8.1 MG/DL (ref 8.3–10.1)
CARDIAC TROPONIN I PNL SERPL HS: 10 NG/L
CARDIAC TROPONIN I PNL SERPL HS: 8 NG/L
CARDIAC TROPONIN I PNL SERPL HS: 9 NG/L
CHLORIDE SERPL-SCNC: 103 MMOL/L (ref 100–108)
CHLORIDE SERPL-SCNC: 106 MMOL/L (ref 100–108)
CO2 SERPL-SCNC: 23 MMOL/L (ref 21–32)
CO2 SERPL-SCNC: 26 MMOL/L (ref 21–32)
CREAT SERPL-MCNC: 2.2 MG/DL (ref 0.6–1.3)
CREAT SERPL-MCNC: 2.36 MG/DL (ref 0.6–1.3)
EOSINOPHIL # BLD AUTO: 0.06 THOUSAND/ΜL (ref 0–0.61)
EOSINOPHIL NFR BLD AUTO: 1 % (ref 0–6)
ERYTHROCYTE [DISTWIDTH] IN BLOOD BY AUTOMATED COUNT: 12.8 % (ref 11.6–15.1)
FLUAV RNA RESP QL NAA+PROBE: NEGATIVE
FLUBV RNA RESP QL NAA+PROBE: NEGATIVE
GFR SERPL CREATININE-BSD FRML MDRD: 26 ML/MIN/1.73SQ M
GFR SERPL CREATININE-BSD FRML MDRD: 29 ML/MIN/1.73SQ M
GLUCOSE SERPL-MCNC: 180 MG/DL (ref 65–140)
GLUCOSE SERPL-MCNC: 225 MG/DL (ref 65–140)
HCT VFR BLD AUTO: 30.6 % (ref 36.5–49.3)
HGB BLD-MCNC: 9.9 G/DL (ref 12–17)
IMM GRANULOCYTES # BLD AUTO: 0.03 THOUSAND/UL (ref 0–0.2)
IMM GRANULOCYTES NFR BLD AUTO: 1 % (ref 0–2)
LYMPHOCYTES # BLD AUTO: 1.19 THOUSANDS/ΜL (ref 0.6–4.47)
LYMPHOCYTES NFR BLD AUTO: 21 % (ref 14–44)
MCH RBC QN AUTO: 28.7 PG (ref 26.8–34.3)
MCHC RBC AUTO-ENTMCNC: 32.4 G/DL (ref 31.4–37.4)
MCV RBC AUTO: 89 FL (ref 82–98)
MONOCYTES # BLD AUTO: 0.37 THOUSAND/ΜL (ref 0.17–1.22)
MONOCYTES NFR BLD AUTO: 7 % (ref 4–12)
NEUTROPHILS # BLD AUTO: 3.96 THOUSANDS/ΜL (ref 1.85–7.62)
NEUTS SEG NFR BLD AUTO: 70 % (ref 43–75)
NRBC BLD AUTO-RTO: 0 /100 WBCS
P AXIS: -4 DEGREES
PLATELET # BLD AUTO: 218 THOUSANDS/UL (ref 149–390)
PMV BLD AUTO: 10.2 FL (ref 8.9–12.7)
POTASSIUM SERPL-SCNC: 4.5 MMOL/L (ref 3.5–5.3)
POTASSIUM SERPL-SCNC: 5 MMOL/L (ref 3.5–5.3)
PR INTERVAL: 126 MS
PROT SERPL-MCNC: 6.6 G/DL (ref 6.4–8.2)
QRS AXIS: 55 DEGREES
QRSD INTERVAL: 94 MS
QT INTERVAL: 370 MS
QTC INTERVAL: 407 MS
RBC # BLD AUTO: 3.45 MILLION/UL (ref 3.88–5.62)
RSV RNA RESP QL NAA+PROBE: NEGATIVE
SARS-COV-2 RNA RESP QL NAA+PROBE: POSITIVE
SODIUM SERPL-SCNC: 139 MMOL/L (ref 136–145)
SODIUM SERPL-SCNC: 139 MMOL/L (ref 136–145)
T WAVE AXIS: 15 DEGREES
VENTRICULAR RATE: 73 BPM
WBC # BLD AUTO: 5.62 THOUSAND/UL (ref 4.31–10.16)

## 2022-01-03 PROCEDURE — 93005 ELECTROCARDIOGRAM TRACING: CPT

## 2022-01-03 PROCEDURE — 0241U HB NFCT DS VIR RESP RNA 4 TRGT: CPT | Performed by: EMERGENCY MEDICINE

## 2022-01-03 PROCEDURE — 36415 COLL VENOUS BLD VENIPUNCTURE: CPT

## 2022-01-03 PROCEDURE — 96360 HYDRATION IV INFUSION INIT: CPT

## 2022-01-03 PROCEDURE — 80048 BASIC METABOLIC PNL TOTAL CA: CPT | Performed by: EMERGENCY MEDICINE

## 2022-01-03 PROCEDURE — 85025 COMPLETE CBC W/AUTO DIFF WBC: CPT | Performed by: EMERGENCY MEDICINE

## 2022-01-03 PROCEDURE — 80053 COMPREHEN METABOLIC PANEL: CPT | Performed by: EMERGENCY MEDICINE

## 2022-01-03 PROCEDURE — 96361 HYDRATE IV INFUSION ADD-ON: CPT

## 2022-01-03 PROCEDURE — 93010 ELECTROCARDIOGRAM REPORT: CPT | Performed by: INTERNAL MEDICINE

## 2022-01-03 PROCEDURE — 84484 ASSAY OF TROPONIN QUANT: CPT | Performed by: EMERGENCY MEDICINE

## 2022-01-03 RX ORDER — LOSARTAN POTASSIUM 100 MG/1
25 TABLET ORAL DAILY
COMMUNITY

## 2022-01-03 RX ADMIN — SODIUM CHLORIDE 500 ML: 0.9 INJECTION, SOLUTION INTRAVENOUS at 05:47

## 2022-01-03 NOTE — ED PROVIDER NOTES
History  Chief Complaint   Patient presents with    Chest Pain     Pt reports low BP reading at home of 95/45  Pt also reports right sided chest pain  Pt reports taking tizanidine for pain with no relief  Pt also reports flu symptoms since 12/24  Pt denies dizziness     52-year-old male presents with hypotension at home  Patient reports right-sided chest pain that he has had recurrently previously  Patient states he was prescribed tizanidine by his primary care physicians at the [place], which he subsequently took and upon checking his blood pressure noted it was low so he came to the emergency room for further evaluation and treatment  Patient states his chest pain has subsequently resolved and he denies any at present  Patient has a history of coronary artery disease  Patient also notes cough and myalgias that have been ongoing for several days  Patient denies any nausea or vomiting  Patient denies any diarrhea  Impression plan:  Hypotension that may be secondary to patient's use of tizanidine  Patient notes chest pain, this has subsequently resolved  Patient's EKG demonstrates normal sinus rhythm with no acute ST segment changes  Patient's EKG demonstrates normal sinus rhythm with no acute ST segment changes, appears similar to prior  Patient's initial evaluation demonstrating positive COVID-19, likely the source of patient's symptoms  Will obtain cardiac evaluation, monitor patient on cardiac monitoring, reassess, and re-evaluate  Will treat patient symptomatically with fluids        History provided by:  Patient  Chest Pain  Pain location:  R chest  Pain quality: aching    Pain radiates to:  Does not radiate  Pain severity:  Moderate  Onset quality:  Gradual  Progression:  Worsening  Relieved by:  Nothing  Worsened by:  Nothing tried  Associated symptoms: cough    Associated symptoms: no abdominal pain, no altered mental status, no anorexia, no back pain, no diaphoresis, no dizziness, no dysphagia, no fatigue, no fever, no headache, no lower extremity edema, no nausea, no numbness, no orthopnea, no palpitations, no shortness of breath, no syncope, not vomiting and no weakness        Prior to Admission Medications   Prescriptions Last Dose Informant Patient Reported? Taking?    Cholecalciferol 1000 units TBDP Not Taking at Unknown time Self Yes No   Sig: Take by mouth daily   Patient not taking: Reported on 1/3/2022    LISINOPRIL PO Not Taking at Unknown time Self Yes No   Sig: Take 20 mg by mouth daily     Patient not taking: Reported on 1/3/2022    amLODIPine (NORVASC) 5 mg tablet  Self No No   Sig: Take 1 tablet (5 mg total) by mouth daily   Patient taking differently: Take 10 mg by mouth daily     aspirin 81 mg chewable tablet  Self No No   Sig: Chew 1 tablet (81 mg total) daily   atorvastatin (LIPITOR) 40 mg tablet  Self No No   Sig: Take 1 tablet (40 mg total) by mouth daily with dinner   Patient taking differently: Take 20 mg by mouth daily with dinner     carvedilol (COREG) 3 125 mg tablet   No No   Sig: Take 2 tablets (6 25 mg total) by mouth 2 (two) times a day with meals   Patient taking differently: Take 12 5 mg by mouth 2 (two) times a day with meals     glipiZIDE (GLUCOTROL) 5 mg tablet  Self Yes No   Sig: Take 2 5 mg by mouth daily before dinner   insulin detemir (LEVEMIR) 100 units/mL subcutaneous injection Not Taking at Unknown time Self Yes No   Sig: Inject 10 Units under the skin daily at bedtime   Patient not taking: Reported on 1/3/2022    isosorbide mononitrate (IMDUR) 30 mg 24 hr tablet  Self No No   Sig: Take 1 tablet (30 mg total) by mouth daily   latanoprost (XALATAN) 0 005 % ophthalmic solution  Self Yes No   Sig: Administer 1 drop to both eyes daily at bedtime   losartan (COZAAR) 100 MG tablet   Yes Yes   Sig: Take 25 mg by mouth daily   metFORMIN (GLUCOPHAGE) 500 mg tablet  Self Yes No   Sig: Take 1,000 mg by mouth 2 (two) times a day with meals   nitroglycerin (NITROSTAT) 0 4 mg SL tablet  Self No No   Sig: Place 1 tablet (0 4 mg total) under the tongue every 5 (five) minutes as needed for chest pain   saxagliptin (ONGLYZA) 5 MG tablet  Self Yes No   Sig: Take 5 mg by mouth daily   terazosin (HYTRIN) 10 MG capsule  Self Yes No   Sig: Take 10 mg by mouth daily at bedtime      Facility-Administered Medications: None       Past Medical History:   Diagnosis Date    BPH (benign prostatic hyperplasia)     Diabetes mellitus ([facility] [address] )     Dyslipidemia     Hypertension        Past Surgical History:   Procedure Laterality Date    CARDIAC SURGERY  07/30/2018    HERNIA REPAIR         Family History   Problem Relation Age of Onset    Heart attack Father [de-identified]     I have reviewed and agree with the history as documented  E-Cigarette/Vaping    E-Cigarette Use Never User      E-Cigarette/Vaping Substances    Nicotine No     THC No     CBD No     Flavoring No     Other No     Unknown No      Social History     Tobacco Use    Smoking status: Never Smoker    Smokeless tobacco: Never Used   Vaping Use    Vaping Use: Never used   Substance Use Topics    Alcohol use: Not Currently    Drug use: No       Review of Systems   Constitutional: Negative for diaphoresis, fatigue and fever  HENT: Negative for trouble swallowing  Respiratory: Positive for cough  Negative for shortness of breath  Cardiovascular: Positive for chest pain  Negative for palpitations, orthopnea and syncope  Gastrointestinal: Negative for abdominal pain, anorexia, nausea and vomiting  Musculoskeletal: Negative for back pain  Neurological: Negative for dizziness, weakness, numbness and headaches  All other systems reviewed and are negative  Physical Exam  Physical Exam  Vitals reviewed  HENT:      Head: Atraumatic  Eyes:      Pupils: Pupils are equal, round, and reactive to light  Cardiovascular:      Rate and Rhythm: Normal rate and regular rhythm     Pulmonary:      Effort: Pulmonary effort is normal       Breath sounds: Normal breath sounds  Abdominal:      General: There is no distension  Musculoskeletal:         General: No deformity  Cervical back: Neck supple  Right lower leg: No tenderness  No edema  Left lower leg: No tenderness  No edema  Skin:     General: Skin is warm and dry  Neurological:      General: No focal deficit present  Mental Status: He is alert     Psychiatric:         Mood and Affect: Mood normal          Vital Signs  ED Triage Vitals   Temperature Pulse Respirations Blood Pressure SpO2   01/03/22 0012 01/03/22 0002 01/03/22 0003 01/03/22 0002 01/03/22 0002   97 6 °F (36 4 °C) 80 18 120/56 97 %      Temp Source Heart Rate Source Patient Position - Orthostatic VS BP Location FiO2 (%)   01/03/22 0012 01/03/22 0233 01/03/22 0002 01/03/22 0002 --   Oral Monitor Sitting Left arm       Pain Score       --                  Vitals:    01/03/22 0258 01/03/22 0500 01/03/22 0530 01/03/22 0730   BP: 127/70 130/70 132/68 146/75   Pulse: (!) 53 59 60 71   Patient Position - Orthostatic VS: Lying   Lying         Visual Acuity      ED Medications  Medications   sodium chloride 0 9 % bolus 500 mL (0 mL Intravenous Stopped 1/3/22 0932)       Diagnostic Studies  Results Reviewed     Procedure Component Value Units Date/Time    Basic metabolic panel [[id number]]  (Abnormal) Collected: 01/03/22 0730    Lab Status: Final result Specimen: Blood from Arm, Left Updated: 01/03/22 0745     Sodium 139 mmol/L      Potassium 5 0 mmol/L      Chloride 106 mmol/L      CO2 23 mmol/L      ANION GAP 10 mmol/L      BUN 43 mg/dL      Creatinine 2 20 mg/dL      Glucose 225 mg/dL      Calcium 7 7 mg/dL      eGFR 29 ml/min/1 73sq m     Narrative:      [place] guidelines for Chronic Kidney Disease (CKD):     Stage 1 with normal or high GFR (GFR > 90 mL/min/1 73 square meters)    Stage 2 Mild CKD (GFR = 60-89 mL/min/1 73 square meters)    Stage 3A Moderate CKD (GFR = 45-59 mL/min/1 73 square meters)    Stage 3B Moderate CKD (GFR = 30-44 mL/min/1 73 square meters)    Stage 4 Severe CKD (GFR = 15-29 mL/min/1 73 square meters)    Stage 5 End Stage CKD (GFR <15 mL/min/1 73 square meters)  Note: GFR calculation is accurate only with a steady state creatinine    HS Troponin I 4hr [[id number]] Collected: 01/03/22 0523    Lab Status: Final result Specimen: Blood from Arm, Left Updated: 01/03/22 0553     hs TnI 4hr 9 ng/L      Delta 4hr hsTnI -1 ng/L     HS Troponin I 2hr [[id number]] Collected: 01/03/22 0233    Lab Status: Final result Specimen: Blood from Arm, Left Updated: 01/03/22 0305     hs TnI 2hr 8 ng/L      Delta 2hr hsTnI -2 ng/L     COVID/FLU/RSV [[id number]]  (Abnormal) Collected: 01/03/22 0010    Lab Status: Final result Specimen: Nares from Nose Updated: 01/03/22 0116     SARS-CoV-2 Positive     INFLUENZA A PCR Negative     INFLUENZA B PCR Negative     RSV PCR Negative    Narrative:      FOR PEDIATRIC PATIENTS - copy/paste COVID Guidelines URL to browser: [email or web address]     This test has been authorized by FDA under an EUA (Emergency Use Assay) for use by authorized laboratories  Clinical caution and judgement should be used with the interpretation of these results with consideration of the clinical impression and other laboratory testing  Testing reported as "Positive" or "Negative" has been proven to be accurate according to standard laboratory validation requirements  All testing is performed with control materials showing appropriate reactivity at standard intervals      HS Troponin 0hr (reflex protocol) [[id number]]  (Normal) Collected: 01/03/22 0010    Lab Status: Final result Specimen: Blood from Arm, Right Updated: 01/03/22 0044     hs TnI 0hr 10 ng/L     Comprehensive metabolic panel [[id number]]  (Abnormal) Collected: 01/03/22 0010    Lab Status: Final result Specimen: Blood from Arm, Right Updated: 01/03/22 0036     Sodium 139 mmol/L      Potassium 4 5 mmol/L      Chloride 103 mmol/L      CO2 26 mmol/L      ANION GAP 10 mmol/L      BUN 40 mg/dL      Creatinine 2 36 mg/dL      Glucose 180 mg/dL      Calcium 8 1 mg/dL      Corrected Calcium 9 2 mg/dL      AST 23 U/L      ALT 26 U/L      Alkaline Phosphatase 72 U/L      Total Protein 6 6 g/dL      Albumin 2 6 g/dL      Total Bilirubin 0 22 mg/dL      eGFR 26 ml/min/1 73sq m     Narrative:      National Kidney Disease Foundation guidelines for Chronic Kidney Disease (CKD):     Stage 1 with normal or high GFR (GFR > 90 mL/min/1 73 square meters)    Stage 2 Mild CKD (GFR = 60-89 mL/min/1 73 square meters)    Stage 3A Moderate CKD (GFR = 45-59 mL/min/1 73 square meters)    Stage 3B Moderate CKD (GFR = 30-44 mL/min/1 73 square meters)    Stage 4 Severe CKD (GFR = 15-29 mL/min/1 73 square meters)    Stage 5 End Stage CKD (GFR <15 mL/min/1 73 square meters)  Note: GFR calculation is accurate only with a steady state creatinine    CBC and differential [[id number]]  (Abnormal) Collected: 01/03/22 0010    Lab Status: Final result Specimen: Blood from Arm, Right Updated: 01/03/22 0020     WBC 5 62 Thousand/uL      RBC 3 45 Million/uL      Hemoglobin 9 9 g/dL      Hematocrit 30 6 %      MCV 89 fL      MCH 28 7 pg      MCHC 32 4 g/dL      RDW 12 8 %      MPV 10 2 fL      Platelets 886 Thousands/uL      nRBC 0 /100 WBCs      Neutrophils Relative 70 %      Immat GRANS % 1 %      Lymphocytes Relative 21 %      Monocytes Relative 7 %      Eosinophils Relative 1 %      Basophils Relative 0 %      Neutrophils Absolute 3 96 Thousands/µL      Immature Grans Absolute 0 03 Thousand/uL      Lymphocytes Absolute 1 19 Thousands/µL      Monocytes Absolute 0 37 Thousand/µL      Eosinophils Absolute 0 06 Thousand/µL      Basophils Absolute 0 01 Thousands/µL                  No orders to display              Procedures  Procedures         ED Course  ED Course as of 01/04/22 2684   Mon Jan 03, 2022   2946 Patient's laboratory analysis consistent with patient's symptoms being secondary to COVID-19  Patient with progression of underlying kidney disease though he is tolerating oral intake at this point  Offered admission to the hospital but after discussion with the patient, he currently declined  Patient is amenable for treatment with IV fluids and repeat of his kidney function  Discussed continued hydration with the patient, symptomatic management for COVID-19, and avoiding use of tizanidine  Patient's blood pressure has improved and he has been monitored while past multiple half lives of the tizanidine which may have been the source of his hypotension  Discussed continued follow-up with existing resources emphasized treatment for COVID-19 including monitoring of pulse oximetry  Suggested contacting primary care today discussed availability of antibody treatment considering risk factors  Discussed return precautions and emphasized these in detail with the patient             HEART Risk Score      Most Recent Value   Heart Score Risk Calculator    History 0 Filed at: 01/03/2022 0620   ECG 0 Filed at: 01/03/2022 8377   Age 2 Filed at: 01/03/2022 8754   Risk Factors 2 Filed at: 01/03/2022 8173   Troponin 0 Filed at: 01/03/2022 0600   HEART Score 4 Filed at: 01/03/2022 5063                        SBIRT 22yo+      Most Recent Value   SBIRT (25 yo +)    In order to provide better care to our patients, we are screening all of our patients for alcohol and drug use  Would it be okay to ask you these screening questions? Yes Filed at: 01/03/2022 0386   Initial Alcohol Screen: US AUDIT-C     1  How often do you have a drink containing alcohol? 0 Filed at: 01/03/2022 0642   2  How many drinks containing alcohol do you have on a typical day you are drinking? 0 Filed at: 01/03/2022 0642   3a  Male UNDER 65: How often do you have five or more drinks on one occasion?  0 Filed at: 01/03/2022 0304   Audit-C Score 0 Filed at: 01/03/2022 5541   SUSI: How many times in the past year have you    Used an illegal drug or used a prescription medication for non-medical reasons? Never Filed at: 01/03/2022 4509                    MDM    Disposition  Final diagnoses:   COVID-19   Chest pain   HEIDY (acute kidney injury) ([address] )     Time reflects when diagnosis was documented in both MDM as applicable and the Disposition within this note     Time User Action Codes Description Comment    1/3/2022  6:21 AM Dana Ba Add [U07 1] COVID-19     1/3/2022  6:21 AM Dana Ba Add [R07 9] Chest pain     1/3/2022  6:21 AM Dana Ba Add [N17 9] HEIDY (acute kidney injury) [facility])       ED Disposition     ED Disposition Condition Date/Time Comment    Discharge Stable Mon Ronn 3, 2022  6:21 AM Laure Mccabe discharge to home/self care  Follow-up Information     Follow up With Specialties Details Why Contact Info Additional 211 Ashia Sherman MD Internal Medicine Schedule an appointment as soon as possible for a visit in 2 days Follow-up and reassessment   [address] [place] [address] Emergency Department Emergency Medicine Go to  If symptoms worsen [address] [facility] [id number] [facility] Emergency Department, [address] [place], [place], [address]          Discharge Medication List as of 1/3/2022  6:22 AM      CONTINUE these medications which have NOT CHANGED    Details   losartan (COZAAR) 100 MG tablet Take 25 mg by mouth daily, Historical Med      amLODIPine (NORVASC) 5 mg tablet Take 1 tablet (5 mg total) by mouth daily, Starting Wed 8/1/2018, Print      aspirin 81 mg chewable tablet Chew 1 tablet (81 mg total) daily, Starting Wed 8/1/2018, Print      atorvastatin (LIPITOR) 40 mg tablet Take 1 tablet (40 mg total) by mouth daily with dinner, Starting Tue 7/31/2018, Print carvedilol (COREG) 3 125 mg tablet Take 2 tablets (6 25 mg total) by mouth 2 (two) times a day with meals, Starting Mon 12/28/2020, Print      Cholecalciferol 1000 units TBDP Take by mouth daily, Historical Med      glipiZIDE (GLUCOTROL) 5 mg tablet Take 2 5 mg by mouth daily before dinner, Historical Med      insulin detemir (LEVEMIR) 100 units/mL subcutaneous injection Inject 10 Units under the skin daily at bedtime, Historical Med      isosorbide mononitrate (IMDUR) 30 mg 24 hr tablet Take 1 tablet (30 mg total) by mouth daily, Starting Tue 7/31/2018, Print      latanoprost (XALATAN) 0 005 % ophthalmic solution Administer 1 drop to both eyes daily at bedtime, Historical Med      LISINOPRIL PO Take 20 mg by mouth daily  , Historical Med      metFORMIN (GLUCOPHAGE) 500 mg tablet Take 1,000 mg by mouth 2 (two) times a day with meals, Historical Med      nitroglycerin (NITROSTAT) 0 4 mg SL tablet Place 1 tablet (0 4 mg total) under the tongue every 5 (five) minutes as needed for chest pain, Starting Tue 7/31/2018, Print      saxagliptin (ONGLYZA) 5 MG tablet Take 5 mg by mouth daily, Historical Med      terazosin (HYTRIN) 10 MG capsule Take 10 mg by mouth daily at bedtime, Historical Med             No discharge procedures on file      PDMP Review     None          ED Provider  Electronically Signed by           Ivory Durbin MD  01/04/22 9401

## 2022-01-06 ENCOUNTER — HOSPITAL ENCOUNTER (EMERGENCY)
Facility: HOSPITAL | Age: 72
Discharge: HOME | End: 2022-01-06
Attending: EMERGENCY MEDICINE
Payer: COMMERCIAL

## 2022-01-06 ENCOUNTER — APPOINTMENT (EMERGENCY)
Dept: CT IMAGING | Facility: HOSPITAL | Age: 72
End: 2022-01-06
Payer: COMMERCIAL

## 2022-01-06 VITALS
HEART RATE: 77 BPM | OXYGEN SATURATION: 100 % | SYSTOLIC BLOOD PRESSURE: 154 MMHG | TEMPERATURE: 97.2 F | BODY MASS INDEX: 26.58 KG/M2 | DIASTOLIC BLOOD PRESSURE: 74 MMHG | WEIGHT: 180 LBS | RESPIRATION RATE: 18 BRPM

## 2022-01-06 DIAGNOSIS — R07.9 CHEST PAIN: Primary | ICD-10-CM

## 2022-01-06 DIAGNOSIS — U07.1 COVID-19: ICD-10-CM

## 2022-01-06 LAB
2HR DELTA HS TROPONIN: -3 NG/L
4HR DELTA HS TROPONIN: 0 NG/L
ANION GAP SERPL CALCULATED.3IONS-SCNC: 9 MMOL/L (ref 4–13)
ATRIAL RATE: 70 BPM
BASOPHILS # BLD AUTO: 0.02 THOUSANDS/ΜL (ref 0–0.1)
BASOPHILS NFR BLD AUTO: 0 % (ref 0–1)
BUN SERPL-MCNC: 29 MG/DL (ref 5–25)
CALCIUM SERPL-MCNC: 8.4 MG/DL (ref 8.3–10.1)
CARDIAC TROPONIN I PNL SERPL HS: 4 NG/L
CARDIAC TROPONIN I PNL SERPL HS: 7 NG/L
CARDIAC TROPONIN I PNL SERPL HS: 7 NG/L
CHLORIDE SERPL-SCNC: 105 MMOL/L (ref 100–108)
CO2 SERPL-SCNC: 25 MMOL/L (ref 21–32)
CREAT SERPL-MCNC: 1.99 MG/DL (ref 0.6–1.3)
D DIMER PPP FEU-MCNC: 1.07 UG/ML FEU
EOSINOPHIL # BLD AUTO: 0.11 THOUSAND/ΜL (ref 0–0.61)
EOSINOPHIL NFR BLD AUTO: 2 % (ref 0–6)
ERYTHROCYTE [DISTWIDTH] IN BLOOD BY AUTOMATED COUNT: 12.8 % (ref 11.6–15.1)
GFR SERPL CREATININE-BSD FRML MDRD: 33 ML/MIN/1.73SQ M
GLUCOSE SERPL-MCNC: 192 MG/DL (ref 65–140)
HCT VFR BLD AUTO: 30.8 % (ref 36.5–49.3)
HGB BLD-MCNC: 10.1 G/DL (ref 12–17)
IMM GRANULOCYTES # BLD AUTO: 0.03 THOUSAND/UL (ref 0–0.2)
IMM GRANULOCYTES NFR BLD AUTO: 1 % (ref 0–2)
LYMPHOCYTES # BLD AUTO: 1.38 THOUSANDS/ΜL (ref 0.6–4.47)
LYMPHOCYTES NFR BLD AUTO: 24 % (ref 14–44)
MCH RBC QN AUTO: 28.9 PG (ref 26.8–34.3)
MCHC RBC AUTO-ENTMCNC: 32.8 G/DL (ref 31.4–37.4)
MCV RBC AUTO: 88 FL (ref 82–98)
MONOCYTES # BLD AUTO: 0.39 THOUSAND/ΜL (ref 0.17–1.22)
MONOCYTES NFR BLD AUTO: 7 % (ref 4–12)
NEUTROPHILS # BLD AUTO: 3.8 THOUSANDS/ΜL (ref 1.85–7.62)
NEUTS SEG NFR BLD AUTO: 66 % (ref 43–75)
NRBC BLD AUTO-RTO: 0 /100 WBCS
P AXIS: 62 DEGREES
PLATELET # BLD AUTO: 317 THOUSANDS/UL (ref 149–390)
PMV BLD AUTO: 10.5 FL (ref 8.9–12.7)
POTASSIUM SERPL-SCNC: 5.2 MMOL/L (ref 3.5–5.3)
PR INTERVAL: 132 MS
QRS AXIS: 56 DEGREES
QRSD INTERVAL: 94 MS
QT INTERVAL: 370 MS
QTC INTERVAL: 399 MS
RBC # BLD AUTO: 3.5 MILLION/UL (ref 3.88–5.62)
SODIUM SERPL-SCNC: 139 MMOL/L (ref 136–145)
T WAVE AXIS: -16 DEGREES
VENTRICULAR RATE: 70 BPM
WBC # BLD AUTO: 5.73 THOUSAND/UL (ref 4.31–10.16)

## 2022-01-06 PROCEDURE — 84484 ASSAY OF TROPONIN QUANT: CPT | Performed by: EMERGENCY MEDICINE

## 2022-01-06 PROCEDURE — 99285 EMERGENCY DEPT VISIT HI MDM: CPT | Performed by: EMERGENCY MEDICINE

## 2022-01-06 PROCEDURE — 93010 ELECTROCARDIOGRAM REPORT: CPT | Performed by: INTERNAL MEDICINE

## 2022-01-06 PROCEDURE — 85379 FIBRIN DEGRADATION QUANT: CPT | Performed by: EMERGENCY MEDICINE

## 2022-01-06 PROCEDURE — 71275 CT ANGIOGRAPHY CHEST: CPT

## 2022-01-06 PROCEDURE — 99285 EMERGENCY DEPT VISIT HI MDM: CPT

## 2022-01-06 PROCEDURE — 36415 COLL VENOUS BLD VENIPUNCTURE: CPT | Performed by: EMERGENCY MEDICINE

## 2022-01-06 PROCEDURE — 80048 BASIC METABOLIC PNL TOTAL CA: CPT | Performed by: EMERGENCY MEDICINE

## 2022-01-06 PROCEDURE — 85025 COMPLETE CBC W/AUTO DIFF WBC: CPT | Performed by: EMERGENCY MEDICINE

## 2022-01-06 PROCEDURE — 93005 ELECTROCARDIOGRAM TRACING: CPT

## 2022-01-06 RX ORDER — SODIUM CHLORIDE 9 MG/ML
3 INJECTION INTRAVENOUS
Status: DISCONTINUED | OUTPATIENT
Start: 2022-01-06 | End: 2022-01-06 | Stop reason: HOSPADM

## 2022-01-06 RX ADMIN — IOHEXOL 100 ML: 350 INJECTION, SOLUTION INTRAVENOUS at 04:31

## 2022-01-06 NOTE — ED PROVIDER NOTES
History  Chief Complaint   Patient presents with    Chest Pain     was here on sunday and diagnosed with covid was told if still has pain to come back      HPI  80 y o  male with recent diagnosis of COVID-19 presents with 3 hours of left sided chest pain without radiation  Patient describes hard that came on while resting and continues in the ER  Patient states nothing worsens the pain and nothing improves the pain  Patient denies pleuritic component to chest pain  Patient denies exertional component to the chest pain  Patient has had a cough since December 24th   Patient states the cough has been persistent but improving  Patient denies fever/chills, nausea/vomiting, diaphoresis, dyspnea, hemoptysis, weakness, dizziness, back pain, syncope, focal weakness or numbness, leg pain or swelling  All other review of systems reviewed and noted to be negative  The patient affirms a history of atherosclerotic disease (CAD/TIA/CVA/PAD)  Patient denies any immobilization of at least 3 days or surgery in the past 4 weeks  Patient denies any history of DVT or PE  Patient denies any history or family history of thrombophilia  Patient denies any malignancy with treatment within the past 6 months  Focused Objective  CV:  Normal inspection with no rash, signs of infection, or trauma  Regular rate and rhythm  No murmur  Peripheral pulses intact and equal   Nontender to palpitation over area of patient's reported pain  Respiratory:  Lungs clear to auscultation bilaterally without adventitious sounds  Skin:  Warm and dry  No rash or signs of herpes zoster over area of patient's reported pain  Extremities:  Non-tender lower extremities without asymmetry; no clinical signs of DVT  No lower extremity edema  Medical Decision Making    Patient presenting with chest pain with a broad differential, including multiple emergent etiologies        EKG obtained and reviewed independently by myself, which was interpreted by myself as listed under ED course  Patient placed on cardiac monitoring  Regarding the possibility for ACS, patient's risk stratification by the HEART SCORE:    HEART score:    History 0=Slightly or non-suspicious  ECG 1=Nonspecific repolarization disturbance  Age 2= > 65 years  Risk Factors 2= > 3 risk factors, or history of atherosclerotic disease  Troponin 0= Less than or equal to 12 ng/L  Total 5      Score 0-3: 1 7% had a MACE risk  0 4% (1 patient)   36 4% of patients were in this low risk group    Score 4-6: 16 6% had a MACE risk    Score 7-10: 50 1% had a MACE risk       The patient's HEART score is 5  CXR will be obtained to evaluate for alternative pathologies, including pneumothorax or pneumonia  Laboratory analysis including troponin to evaluate for ACS, CBC to evaluate for anemia or leukocytosis, and BMP to evaluate renal function and electrolytes considering possibility of cardiac involvement  Patient has symptoms and history concerning for possible pulmonary embolism  Regarding this etiology, patient's risk stratification by the Wells' Criteria for Pulmonary Embolism (Two Tier Model):  no - clinical signs or symptoms of DVT (3)  no - PE most likely diagnosis (3)  no - Heart rate greater than 100 (1 5)  no - Immobilization at least 3 days OR surgery in the previous 4 weeks (1 5)  no - Previous, objectively diagnosed PE or DVT (1 5)  no - Hemoptysis (1)  no - Malignancy with treatment within 6 months or palliative (1)    Patient's risk further evaluated by the [facility] Criteria for Pulmonary Embolism:  yes - age is greater than or equal to 50  no - Heart rate greater than 100  no - O2 sat on room air < 95%  no - Prior history of PE or DVT  no - Recent trauma or surgery  no - Hemoptysis  no - Use of exogenous estrogen  no - Unilateral leg swelling    Patient has a low risk Wells' criteria but is unable to be cleared via the [facility] criteria    As such, a D-Dimer will be ordered for the patient to evaluate for the potential for pulmonary embolism  If positive, CT imaging of the patient's chest will be obtained to evaluate for potential pulmonary embolism  Patient is greater than 48years old and in accordance with [facility] on Venous Thrombosis (May 2018), as the patient is a low or intermediate risk for acute PE, an age-adjusted D-dimer will be utilized to screen for possibility of pulmonary embolism  Patient has a HEART score of 5  Will monitor and reassess with delta high sensitivity troponin at 2 hours  History provided by:  Patient  Chest Pain  Pain location:  L chest  Pain quality comment:  See HPI  Pain radiates to:  Does not radiate  Pain severity:  Moderate  Onset quality:  Sudden  Timing:  Constant  Progression:  Unchanged  Relieved by:  Nothing  Worsened by:  Nothing tried  Associated symptoms: cough    Associated symptoms: no abdominal pain, no altered mental status, no back pain, no claudication, no diaphoresis, no dizziness, no dysphagia, no fatigue, no fever, no headache, no lower extremity edema, no nausea, no near-syncope, no numbness, no orthopnea, no palpitations, no shortness of breath, no syncope, not vomiting and no weakness        Prior to Admission Medications   Prescriptions Last Dose Informant Patient Reported? Taking?    Cholecalciferol 1000 units TBDP  Self Yes No   Sig: Take by mouth daily   Patient not taking: Reported on 1/3/2022    LISINOPRIL PO  Self Yes No   Sig: Take 20 mg by mouth daily     Patient not taking: Reported on 1/3/2022    amLODIPine (NORVASC) 5 mg tablet  Self No No   Sig: Take 1 tablet (5 mg total) by mouth daily   Patient taking differently: Take 10 mg by mouth daily     aspirin 81 mg chewable tablet  Self No No   Sig: Chew 1 tablet (81 mg total) daily   atorvastatin (LIPITOR) 40 mg tablet  Self No No   Sig: Take 1 tablet (40 mg total) by mouth daily with dinner   Patient taking differently: Take 20 mg by mouth daily with dinner     carvedilol (COREG) 3 125 mg tablet   No No   Sig: Take 2 tablets (6 25 mg total) by mouth 2 (two) times a day with meals   Patient taking differently: Take 12 5 mg by mouth 2 (two) times a day with meals     glipiZIDE (GLUCOTROL) 5 mg tablet  Self Yes No   Sig: Take 2 5 mg by mouth daily before dinner   insulin detemir (LEVEMIR) 100 units/mL subcutaneous injection  Self Yes No   Sig: Inject 10 Units under the skin daily at bedtime   Patient not taking: Reported on 1/3/2022    isosorbide mononitrate (IMDUR) 30 mg 24 hr tablet  Self No No   Sig: Take 1 tablet (30 mg total) by mouth daily   latanoprost (XALATAN) 0 005 % ophthalmic solution  Self Yes No   Sig: Administer 1 drop to both eyes daily at bedtime   losartan (COZAAR) 100 MG tablet   Yes No   Sig: Take 25 mg by mouth daily   metFORMIN (GLUCOPHAGE) 500 mg tablet  Self Yes No   Sig: Take 1,000 mg by mouth 2 (two) times a day with meals   nitroglycerin (NITROSTAT) 0 4 mg SL tablet  Self No No   Sig: Place 1 tablet (0 4 mg total) under the tongue every 5 (five) minutes as needed for chest pain   saxagliptin (ONGLYZA) 5 MG tablet  Self Yes No   Sig: Take 5 mg by mouth daily   terazosin (HYTRIN) 10 MG capsule  Self Yes No   Sig: Take 10 mg by mouth daily at bedtime      Facility-Administered Medications: None       Past Medical History:   Diagnosis Date    BPH (benign prostatic hyperplasia)     Diabetes mellitus ([facility] [address] )     Dyslipidemia     Hypertension        Past Surgical History:   Procedure Laterality Date    CARDIAC SURGERY  07/30/2018    HERNIA REPAIR         Family History   Problem Relation Age of Onset    Heart attack Father [de-identified]     I have reviewed and agree with the history as documented      E-Cigarette/Vaping    E-Cigarette Use Never User      E-Cigarette/Vaping Substances    Nicotine No     THC No     CBD No     Flavoring No     Other No     Unknown No      Social History     Tobacco Use    Smoking status: Never Smoker  Smokeless tobacco: Never Used   Vaping Use    Vaping Use: Never used   Substance Use Topics    Alcohol use: Not Currently    Drug use: No       Review of Systems   Constitutional: Negative for diaphoresis, fatigue and fever  HENT: Negative for trouble swallowing  Respiratory: Positive for cough  Negative for shortness of breath  Cardiovascular: Positive for chest pain  Negative for palpitations, orthopnea, claudication, syncope and near-syncope  Gastrointestinal: Negative for abdominal pain, nausea and vomiting  Musculoskeletal: Negative for back pain  Neurological: Negative for dizziness, weakness, numbness and headaches  All other systems reviewed and are negative  Physical Exam  Physical Exam  Vitals reviewed  HENT:      Head: Atraumatic  Eyes:      Pupils: Pupils are equal, round, and reactive to light  Cardiovascular:      Rate and Rhythm: Normal rate and regular rhythm  Heart sounds: Normal heart sounds  Pulmonary:      Breath sounds: Normal breath sounds  Abdominal:      General: There is no distension  Palpations: Abdomen is soft  Tenderness: There is no abdominal tenderness  There is no guarding or rebound  Musculoskeletal:         General: No deformity  Cervical back: Neck supple  Right lower leg: No tenderness  No edema  Left lower leg: No tenderness  No edema  Skin:     General: Skin is warm and dry  Neurological:      General: No focal deficit present  Mental Status: He is alert           Vital Signs  ED Triage Vitals [01/06/22 0237]   Temperature Pulse Respirations Blood Pressure SpO2   (!) 97 2 °F (36 2 °C) 77 18 140/72 99 %      Temp Source Heart Rate Source Patient Position - Orthostatic VS BP Location FiO2 (%)   Oral Monitor Sitting Left arm --      Pain Score       6           Vitals:    01/06/22 0430 01/06/22 0500 01/06/22 0530 01/06/22 0613   BP: 160/73 144/70 153/71 154/74   Pulse: 71 66 64 77   Patient Position - Orthostatic VS: Lying Lying Lying Lying         Visual Acuity      ED Medications  Medications   sodium chloride (PF) 0 9 % injection 3 mL (has no administration in time range)   iohexol (OMNIPAQUE) 350 MG/ML injection (MULTI-DOSE) 100 mL (100 mL Intravenous Given 1/6/22 4451)       Diagnostic Studies  Results Reviewed     Procedure Component Value Units Date/Time    HS Troponin I 2hr [[id number]] Collected: 01/06/22 0509    Lab Status: Final result Specimen: Blood from Arm, Left Updated: 01/06/22 0551     hs TnI 2hr 4 ng/L      Delta 2hr hsTnI -3 ng/L     HS Troponin I 4hr [[id number]] Collected: 01/06/22 0510    Lab Status: Final result Specimen: Blood from Arm, Left Updated: 01/06/22 0551     hs TnI 4hr 7 ng/L      Delta 4hr hsTnI 0 ng/L     D-dimer, quantitative [[id number]]  (Abnormal) Collected: 01/06/22 0312    Lab Status: Final result Specimen: Blood from Arm, Left Updated: 01/06/22 0338     D-Dimer, Quant 1 07 ug/ml FEU     HS Troponin 0hr (reflex protocol) [[id number]]  (Normal) Collected: 01/06/22 0301    Lab Status: Final result Specimen: Blood from Arm, Left Updated: 01/06/22 0332     hs TnI 0hr 7 ng/L     Basic metabolic panel [[id number]]  (Abnormal) Collected: 01/06/22 0301    Lab Status: Final result Specimen: Blood from Arm, Left Updated: 01/06/22 0319     Sodium 139 mmol/L      Potassium 5 2 mmol/L      Chloride 105 mmol/L      CO2 25 mmol/L      ANION GAP 9 mmol/L      BUN 29 mg/dL      Creatinine 1 99 mg/dL      Glucose 192 mg/dL      Calcium 8 4 mg/dL      eGFR 33 ml/min/1 73sq m     Narrative:      [place] guidelines for Chronic Kidney Disease (CKD):     Stage 1 with normal or high GFR (GFR > 90 mL/min/1 73 square meters)    Stage 2 Mild CKD (GFR = 60-89 mL/min/1 73 square meters)    Stage 3A Moderate CKD (GFR = 45-59 mL/min/1 73 square meters)    Stage 3B Moderate CKD (GFR = 30-44 mL/min/1 73 square meters)    Stage 4 Severe CKD (GFR = 15-29 mL/min/1 73 square meters)    Stage 5 End Stage CKD (GFR <15 mL/min/1 73 square meters)  Note: GFR calculation is accurate only with a steady state creatinine    CBC and differential [[id number]]  (Abnormal) Collected: 01/06/22 0301    Lab Status: Final result Specimen: Blood from Arm, Left Updated: 01/06/22 0306     WBC 5 73 Thousand/uL      RBC 3 50 Million/uL      Hemoglobin 10 1 g/dL      Hematocrit 30 8 %      MCV 88 fL      MCH 28 9 pg      MCHC 32 8 g/dL      RDW 12 8 %      MPV 10 5 fL      Platelets 976 Thousands/uL      nRBC 0 /100 WBCs      Neutrophils Relative 66 %      Immat GRANS % 1 %      Lymphocytes Relative 24 %      Monocytes Relative 7 %      Eosinophils Relative 2 %      Basophils Relative 0 %      Neutrophils Absolute 3 80 Thousands/µL      Immature Grans Absolute 0 03 Thousand/uL      Lymphocytes Absolute 1 38 Thousands/µL      Monocytes Absolute 0 39 Thousand/µL      Eosinophils Absolute 0 11 Thousand/µL      Basophils Absolute 0 02 Thousands/µL                  CTA ED chest PE Study   Final Result by Naomi Ellis MD (01/06 1052)      No intraluminal filling defect to suggest an acute pulmonary embolus  Multifocal patchy groundglass airspace opacities noted throughout the periphery of both upper and lower lobes most compatible with the patient's history of covid-19 infection  Workstation performed: [id number]                    Procedures  Procedures         ED Course  ED Course as of 01/06/22 0625   Thu Jan 06, 2022   0245 The EKG demonstrates normal sinus rhythm with no acute ST segment changes  This appears similar to the prior EKG  0555 Patient's delta troponin negative  Discussed with the patient the need for continued follow-up and monitoring of pulse oximetry the patient's pulse oximetry has been normal here  Discussed contacting [place] for close follow-up with tele visits and emphasized return to emergency room with any progression or worsening symptoms considering COVID-19  Discussed symptomatic management regarding this  SBIRT 22yo+      Most Recent Value   SBIRT (24 yo +)    In order to provide better care to our patients, we are screening all of our patients for alcohol and drug use  Would it be okay to ask you these screening questions? Yes Filed at: 01/06/2022 2152   Initial Alcohol Screen: US AUDIT-C     1  How often do you have a drink containing alcohol? 0 Filed at: 01/06/2022 0304   2  How many drinks containing alcohol do you have on a typical day you are drinking? 0 Filed at: 01/06/2022 0304   3a  Male UNDER 65: How often do you have five or more drinks on one occasion? 0 Filed at: 01/06/2022 0304   3b  FEMALE Any Age, or MALE 65+: How often do you have 4 or more drinks on one occassion? 0 Filed at: 01/06/2022 0304   Audit-C Score 0 Filed at: 01/06/2022 1113   SUSI: How many times in the past year have you    Used an illegal drug or used a prescription medication for non-medical reasons? Never Filed at: 01/06/2022 8903                    MDM    Disposition  Final diagnoses:   Chest pain   COVID-19     Time reflects when diagnosis was documented in both MDM as applicable and the Disposition within this note     Time User Action Codes Description Comment    1/6/2022  5:13 AM Maria Ines Barrett Add [R07 9] Chest pain     1/6/2022  5:13 AM Maria Ines Barrett Add [U07 1] COVID-19       ED Disposition     ED Disposition Condition Date/Time Comment    Discharge Stable Thu Jan 6, 2022  5:13 AM Neri Posada discharge to home/self care  Follow-up Information     Follow up With Specialties Details Why Contact Info Additional 211 Ashia Sherman MD Internal Medicine Schedule an appointment as soon as possible for a visit in 2 days Follow-up and reassessment with tele visit   [address] Emergency Department Emergency Medicine Go to  If symptoms worsen [address]  [address] [facility] [id number] [facility] Emergency Department, [address] [facility], [place], [place], [address]          Discharge Medication List as of 1/6/2022  5:13 AM      CONTINUE these medications which have NOT CHANGED    Details   amLODIPine (NORVASC) 5 mg tablet Take 1 tablet (5 mg total) by mouth daily, Starting Wed 8/1/2018, Print      aspirin 81 mg chewable tablet Chew 1 tablet (81 mg total) daily, Starting Wed 8/1/2018, Print      atorvastatin (LIPITOR) 40 mg tablet Take 1 tablet (40 mg total) by mouth daily with dinner, Starting Tue 7/31/2018, Print      carvedilol (COREG) 3 125 mg tablet Take 2 tablets (6 25 mg total) by mouth 2 (two) times a day with meals, Starting Mon 12/28/2020, Print      Cholecalciferol 1000 units TBDP Take by mouth daily, Historical Med      glipiZIDE (GLUCOTROL) 5 mg tablet Take 2 5 mg by mouth daily before dinner, Historical Med      insulin detemir (LEVEMIR) 100 units/mL subcutaneous injection Inject 10 Units under the skin daily at bedtime, Historical Med      isosorbide mononitrate (IMDUR) 30 mg 24 hr tablet Take 1 tablet (30 mg total) by mouth daily, Starting Tue 7/31/2018, Print      latanoprost (XALATAN) 0 005 % ophthalmic solution Administer 1 drop to both eyes daily at bedtime, Historical Med      LISINOPRIL PO Take 20 mg by mouth daily  , Historical Med      losartan (COZAAR) 100 MG tablet Take 25 mg by mouth daily, Historical Med      metFORMIN (GLUCOPHAGE) 500 mg tablet Take 1,000 mg by mouth 2 (two) times a day with meals, Historical Med      nitroglycerin (NITROSTAT) 0 4 mg SL tablet Place 1 tablet (0 4 mg total) under the tongue every 5 (five) minutes as needed for chest pain, Starting Tue 7/31/2018, Print      saxagliptin (ONGLYZA) 5 MG tablet Take 5 mg by mouth daily, Historical Med      terazosin (HYTRIN) 10 MG capsule Take 10 mg by mouth daily at bedtime, Historical Med             No discharge procedures on file      PDMP Review     None          ED Provider  Electronically Signed by           Gayle Interiano MD  01/06/22 5985
